# Patient Record
Sex: FEMALE | Race: BLACK OR AFRICAN AMERICAN | Employment: FULL TIME | URBAN - METROPOLITAN AREA
[De-identification: names, ages, dates, MRNs, and addresses within clinical notes are randomized per-mention and may not be internally consistent; named-entity substitution may affect disease eponyms.]

---

## 2018-03-05 ENCOUNTER — APPOINTMENT (OUTPATIENT)
Dept: PHYSICAL THERAPY | Age: 23
End: 2018-03-05

## 2021-02-14 ENCOUNTER — APPOINTMENT (OUTPATIENT)
Dept: GENERAL RADIOLOGY | Age: 26
End: 2021-02-14
Attending: EMERGENCY MEDICINE
Payer: MEDICAID

## 2021-02-14 ENCOUNTER — HOSPITAL ENCOUNTER (EMERGENCY)
Age: 26
Discharge: HOME | End: 2021-02-14
Attending: EMERGENCY MEDICINE
Payer: MEDICAID

## 2021-02-14 VITALS
RESPIRATION RATE: 18 BRPM | SYSTOLIC BLOOD PRESSURE: 133 MMHG | DIASTOLIC BLOOD PRESSURE: 77 MMHG | WEIGHT: 167 LBS | OXYGEN SATURATION: 100 % | HEART RATE: 62 BPM | TEMPERATURE: 98.9 F | HEIGHT: 62 IN | BODY MASS INDEX: 30.73 KG/M2

## 2021-02-14 DIAGNOSIS — R51.9 NONINTRACTABLE HEADACHE, UNSPECIFIED CHRONICITY PATTERN, UNSPECIFIED HEADACHE TYPE: ICD-10-CM

## 2021-02-14 DIAGNOSIS — E86.0 MILD DEHYDRATION: ICD-10-CM

## 2021-02-14 DIAGNOSIS — U07.1 COVID-19: Primary | ICD-10-CM

## 2021-02-14 LAB
ALBUMIN SERPL-MCNC: 3.9 G/DL (ref 3.4–5)
ALBUMIN/GLOB SERPL: 1 {RATIO} (ref 0.8–1.7)
ALP SERPL-CCNC: 93 U/L (ref 45–117)
ALT SERPL-CCNC: 14 U/L (ref 13–56)
ANION GAP SERPL CALC-SCNC: 4 MMOL/L (ref 3–18)
AST SERPL-CCNC: 8 U/L (ref 10–38)
BASOPHILS # BLD: 0 K/UL (ref 0–0.1)
BASOPHILS NFR BLD: 0 % (ref 0–2)
BILIRUB SERPL-MCNC: 0.3 MG/DL (ref 0.2–1)
BUN SERPL-MCNC: 9 MG/DL (ref 7–18)
BUN/CREAT SERPL: 11 (ref 12–20)
CALCIUM SERPL-MCNC: 8.9 MG/DL (ref 8.5–10.1)
CHLORIDE SERPL-SCNC: 108 MMOL/L (ref 100–111)
CO2 SERPL-SCNC: 28 MMOL/L (ref 21–32)
CREAT SERPL-MCNC: 0.82 MG/DL (ref 0.6–1.3)
DIFFERENTIAL METHOD BLD: NORMAL
EOSINOPHIL # BLD: 0 K/UL (ref 0–0.4)
EOSINOPHIL NFR BLD: 1 % (ref 0–5)
ERYTHROCYTE [DISTWIDTH] IN BLOOD BY AUTOMATED COUNT: 12.1 % (ref 11.6–14.5)
GLOBULIN SER CALC-MCNC: 3.8 G/DL (ref 2–4)
GLUCOSE SERPL-MCNC: 83 MG/DL (ref 74–99)
HCT VFR BLD AUTO: 43.7 % (ref 35–45)
HGB BLD-MCNC: 14.2 G/DL (ref 12–16)
LYMPHOCYTES # BLD: 1.6 K/UL (ref 0.9–3.6)
LYMPHOCYTES NFR BLD: 35 % (ref 21–52)
MCH RBC QN AUTO: 29.8 PG (ref 24–34)
MCHC RBC AUTO-ENTMCNC: 32.5 G/DL (ref 31–37)
MCV RBC AUTO: 91.8 FL (ref 74–97)
MONOCYTES # BLD: 0.4 K/UL (ref 0.05–1.2)
MONOCYTES NFR BLD: 8 % (ref 3–10)
NEUTS SEG # BLD: 2.6 K/UL (ref 1.8–8)
NEUTS SEG NFR BLD: 56 % (ref 40–73)
PLATELET # BLD AUTO: 228 K/UL (ref 135–420)
PMV BLD AUTO: 11.2 FL (ref 9.2–11.8)
POTASSIUM SERPL-SCNC: 3.6 MMOL/L (ref 3.5–5.5)
PROT SERPL-MCNC: 7.7 G/DL (ref 6.4–8.2)
RBC # BLD AUTO: 4.76 M/UL (ref 4.2–5.3)
SODIUM SERPL-SCNC: 140 MMOL/L (ref 136–145)
WBC # BLD AUTO: 4.7 K/UL (ref 4.6–13.2)

## 2021-02-14 PROCEDURE — 96361 HYDRATE IV INFUSION ADD-ON: CPT

## 2021-02-14 PROCEDURE — 74011250636 HC RX REV CODE- 250/636: Performed by: EMERGENCY MEDICINE

## 2021-02-14 PROCEDURE — 96375 TX/PRO/DX INJ NEW DRUG ADDON: CPT

## 2021-02-14 PROCEDURE — 96360 HYDRATION IV INFUSION INIT: CPT

## 2021-02-14 PROCEDURE — 71045 X-RAY EXAM CHEST 1 VIEW: CPT

## 2021-02-14 PROCEDURE — 85025 COMPLETE CBC W/AUTO DIFF WBC: CPT

## 2021-02-14 PROCEDURE — 96374 THER/PROPH/DIAG INJ IV PUSH: CPT

## 2021-02-14 PROCEDURE — 80053 COMPREHEN METABOLIC PANEL: CPT

## 2021-02-14 PROCEDURE — 99284 EMERGENCY DEPT VISIT MOD MDM: CPT

## 2021-02-14 RX ORDER — BUTALBITAL AND ACETAMINOPHEN 50; 300 MG/1; MG/1
1 CAPSULE ORAL
Qty: 15 CAP | Refills: 0 | Status: SHIPPED | OUTPATIENT
Start: 2021-02-14 | End: 2021-02-19

## 2021-02-14 RX ORDER — KETOROLAC TROMETHAMINE 30 MG/ML
15 INJECTION, SOLUTION INTRAMUSCULAR; INTRAVENOUS
Status: COMPLETED | OUTPATIENT
Start: 2021-02-14 | End: 2021-02-14

## 2021-02-14 RX ORDER — DIPHENHYDRAMINE HYDROCHLORIDE 50 MG/ML
25 INJECTION, SOLUTION INTRAMUSCULAR; INTRAVENOUS
Status: COMPLETED | OUTPATIENT
Start: 2021-02-14 | End: 2021-02-14

## 2021-02-14 RX ORDER — PROCHLORPERAZINE EDISYLATE 5 MG/ML
10 INJECTION INTRAMUSCULAR; INTRAVENOUS ONCE
Status: COMPLETED | OUTPATIENT
Start: 2021-02-14 | End: 2021-02-14

## 2021-02-14 RX ORDER — LEVONORGESTREL 52 MG/1
1 INTRAUTERINE DEVICE INTRAUTERINE ONCE
COMMUNITY
End: 2022-09-07

## 2021-02-14 RX ADMIN — PROCHLORPERAZINE EDISYLATE 10 MG: 5 INJECTION INTRAMUSCULAR; INTRAVENOUS at 20:33

## 2021-02-14 RX ADMIN — DIPHENHYDRAMINE HYDROCHLORIDE 25 MG: 50 INJECTION, SOLUTION INTRAMUSCULAR; INTRAVENOUS at 20:33

## 2021-02-14 RX ADMIN — KETOROLAC TROMETHAMINE 15 MG: 30 INJECTION, SOLUTION INTRAMUSCULAR at 20:33

## 2021-02-14 RX ADMIN — SODIUM CHLORIDE 1000 ML: 900 INJECTION, SOLUTION INTRAVENOUS at 20:35

## 2021-02-15 ENCOUNTER — PATIENT OUTREACH (OUTPATIENT)
Dept: CASE MANAGEMENT | Age: 26
End: 2021-02-15

## 2021-02-15 NOTE — DISCHARGE INSTRUCTIONS
[facility] Guidance for Preventing the Spread of Coronavirus Disease 2019 (COVID-19) in Homes and Residential Communities  Prevention steps for:  People with confirmed or suspected COVID-19 (including persons under investigation) who do not need to be hospitalized  and  People with confirmed COVID-19 who were hospitalized and determined to be medically stable to go home  Your healthcare provider and public health staff will evaluate whether you can be cared for at home. If it is determined that you do not need to be hospitalized and can be isolated at home, you will be monitored by staff from your local or state health department. You should follow the prevention steps below until a healthcare provider or local or state health department says you can return to your normal activities. Stay home except to get medical care  You should restrict activities outside your home, except for getting medical care. Do not go to work, school, or public areas. Avoid using public transportation, ride-sharing, or taxis. Separate yourself from other people and animals in your home  People: As much as possible, you should stay in a specific room and away from other people in your home. Also, you should use a separate bathroom, if available. Animals: You should restrict contact with pets and other animals while you are sick with COVID-19, just like you would around other people. Although there have not been reports of pets or other animals becoming sick with COVID-19, it is still recommended that people sick with COVID-19 limit contact with animals until more information is known about the virus. When possible, have another member of your household care for your animals while you are sick. If you are sick with COVID-19, avoid contact with your pet, including petting, snuggling, being kissed or licked, and sharing food.  If you must care for your pet or be around animals while you are sick, wash your hands before and after you interact with pets and wear a facemask. Call ahead before visiting your doctor  If you have a medical appointment, call the healthcare provider and tell them that you have or may have COVID-19. This will help the healthcare provider's office take steps to keep other people from getting infected or exposed. Wear a facemask  You should wear a facemask when you are around other people (e.g., sharing a room or vehicle) or pets and before you enter a healthcare provider's office. If you are not able to wear a facemask (for example, because it causes trouble breathing), then people who live with you should not stay in the same room with you, or they should wear a facemask if they enter your room. Cover your coughs and sneezes  Cover your mouth and nose with a tissue when you cough or sneeze. Throw used tissues in a lined trash can; immediately wash your hands with soap and water for at least 20 seconds or clean your hands with an alcohol-based hand  that contains 60 to 95% alcohol, covering all surfaces of your hands and rubbing them together until they feel dry. Soap and water should be used preferentially if hands are visibly dirty. Clean your hands often  Wash your hands often with soap and water for at least 20 seconds or clean your hands with an alcohol-based hand  that contains 60 to 95% alcohol, covering all surfaces of your hands and rubbing them together until they feel dry. Soap and water should be used preferentially if hands are visibly dirty. Avoid touching your eyes, nose, and mouth with unwashed hands. Avoid sharing personal household items  You should not share dishes, drinking glasses, cups, eating utensils, towels, or bedding with other people or pets in your home. After using these items, they should be washed thoroughly with soap and water.   Clean all high-touch surfaces everyday  High touch surfaces include counters, tabletops, doorknobs, bathroom fixtures, toilets, phones, keyboards, tablets, and bedside tables. Also, clean any surfaces that may have blood, stool, or body fluids on them. Use a household cleaning spray or wipe, according to the label instructions. Labels contain instructions for safe and effective use of the cleaning product including precautions you should take when applying the product, such as wearing gloves and making sure you have good ventilation during use of the product. Monitor your symptoms  Seek prompt medical attention if your illness is worsening (e.g., difficulty breathing). Before seeking care, call your healthcare provider and tell them that you have, or are being evaluated for, COVID-19. Put on a facemask before you enter the facility. These steps will help the healthcare provider's office to keep other people in the office or waiting room from getting infected or exposed. Ask your healthcare provider to call the local or state health department. Persons who are placed under active monitoring or facilitated self-monitoring should follow instructions provided by their local health department or occupational health professionals, as appropriate. If you have a medical emergency and need to call 911, notify the dispatch personnel that you have, or are being evaluated for COVID-19. If possible, put on a facemask before emergency medical services arrive. Discontinuing home isolation  Patients with confirmed COVID-19 should remain under home isolation precautions until the risk of secondary transmission to others is thought to be low. The decision to discontinue home isolation precautions should be made on a case-by-case basis, in consultation with healthcare providers and state and local health departments.   Recommended precautions for household members, intimate partners, and caregivers in a nonhealthcare setting of  A patient with symptomatic laboratory-confirmed COVID-19  or  A patient under investigation  Household members, intimate partners, and caregivers in a nonhealthcare setting may have close contact2 with a person with symptomatic, laboratory-confirmed COVID-19 or a person under investigation. Close contacts should monitor their health; they should call their healthcare provider right away if they develop symptoms suggestive of COVID-19 (e.g., fever, cough, shortness of breath)   Close contacts should also follow these recommendations:  Make sure that you understand and can help the patient follow their healthcare provider's instructions for medication(s) and care. You should help the patient with basic needs in the home and provide support for getting groceries, prescriptions, and other personal needs. Monitor the patient's symptoms. If the patient is getting sicker, call his or her healthcare provider and tell them that the patient has laboratory-confirmed COVID-19. This will help the healthcare provider's office take steps to keep other people in the office or waiting room from getting infected. Ask the healthcare provider to call the local or [facility] health department for additional guidance. If the patient has a medical emergency and you need to call 911, notify the dispatch personnel that the patient has, or is being evaluated for COVID-19. Household members should stay in another room or be  from the patient as much as possible. Household members should use a separate bedroom and bathroom, if available. Prohibit visitors who do not have an essential need to be in the home. Household members should care for any pets in the home. Do not handle pets or other animals while sick. Make sure that shared spaces in the home have good air flow, such as by an air conditioner or an opened window, weather permitting. Perform hand hygiene frequently.  Wash your hands often with soap and water for at least 20 seconds or use an alcohol-based hand  that contains 60 to 95% alcohol, covering all surfaces of your hands and rubbing them together until they feel dry. Soap and water should be used preferentially if hands are visibly dirty. Avoid touching your eyes, nose, and mouth with unwashed hands. You and the patient should wear a facemask if you are in the same room. Wear a disposable facemask and gloves when you touch or have contact with the patient's blood, stool, or body fluids, such as saliva, sputum, nasal mucus, vomit, urine. Throw out disposable facemasks and gloves after using them. Do not reuse. When removing personal protective equipment, first remove and dispose of gloves. Then, immediately clean your hands with soap and water or alcohol-based hand . Next, remove and dispose of facemask, and immediately clean your hands again with soap and water or alcohol-based hand . Avoid sharing household items with the patient. You should not share dishes, drinking glasses, cups, eating utensils, towels, bedding, or other items. After the patient uses these items, you should wash them thoroughly (see below [employer]). Clean all high-touch surfaces, such as counters, tabletops, doorknobs, bathroom fixtures, toilets, phones, keyboards, tablets, and bedside tables, every day. Also, clean any surfaces that may have blood, stool, or body fluids on them. Use a household cleaning spray or wipe, according to the label instructions. Labels contain instructions for safe and effective use of the cleaning product including precautions you should take when applying the product, such as wearing gloves and making sure you have good ventilation during use of the product. [address] thoroughly. Immediately remove and wash clothes or bedding that have blood, stool, or body fluids on them. Wear disposable gloves while handling soiled items and keep soiled items away from your body. Clean your hands (with soap and water or an alcohol-based hand ) immediately after removing your gloves.   Read and follow directions on labels of laundry or clothing items and detergent. In general, using a normal laundry detergent according to washing machine instructions and dry thoroughly using the warmest temperatures recommended on the clothing label. Place all used disposable gloves, facemasks, and other contaminated items in a lined container before disposing of them with other household waste. Clean your hands (with soap and water or an alcohol-based hand ) immediately after handling these items. Soap and water should be used preferentially if hands are visibly dirty. Discuss any additional questions with your state or local health department or healthcare provider. Footnotes  2Close contact is defined as--  a) being within approximately 6 feet (2 meters) of a COVID-19 case for a prolonged period of time; close contact can occur while caring for, living with, visiting, or sharing a health care waiting area or room with a COVID-19 case  - or -  b) having direct contact with infectious secretions of a COVID-19 case (e.g., being coughed on).   Page last reviewed: February 18, 2020   Content source: [facility] Division of Viral Diseases

## 2021-02-15 NOTE — PROGRESS NOTES
Outreach attempt regarding COVID-19 diagnosis\". Discussed COVID-19 related testing which was not done at this time. Test results were not done. Patient informed of results, if available? N/a  Patient reports she tested positive for COVID-19 at [facility] 6 days ago. Date/Time:  2/15/2021 4:44 PM   Call within 2 business days of discharge: Yes     Care Transitions Nurse (CTN) made two separate attempts to contact patient on 02/15/21  to complete COVID care transitions. Unable to reach. Left HIPAA compliant [facility] requesting a return call. Resolving COVID Care Transitions episode. Patient has CTN's contact information.

## 2021-02-15 NOTE — ED PROVIDER NOTES
EMERGENCY DEPARTMENT HISTORY AND PHYSICAL EXAM    8:15 PM    Date: 2/14/2021  Patient Name: Sung Hermosillo    History of Presenting Illness     Chief Complaint   Patient presents with    Headache    Shortness of Breath    Nausea       History Provided By: Patient  Location/Duration/Severity/Modifying factors   Patient is a 47-year-old female presenting to the emergency department with a chief complaint of a headache primarily. She complains of right of other symptoms including mildly short of breath, not experiencing chest pain, complains of nausea was well. She does feel photophobic. She was diagnosed with COVID-19 4 days ago, symptoms really started about 6 days ago. She has loss of taste and smell as well. She denies any PEs in the past.  Not taking any oral contraceptives. No recent long travel, no leg swelling. She tells me that she feels quite anxious about all of her symptoms but her primary concern is her headache. Patient indicates that that has been going on for the past 2 days, is been waxing waning but is at times quite severe, located the right side of her head, described as throbbing. Complains of some muscle spasms in her right shoulder. Has diffuse myalgias. Has been having fevers but none present at this time. Feels generally fatigued as well. Headache   Associated symptoms include shortness of breath and nausea. Pertinent negatives include no fever and no vomiting. Shortness of Breath  Associated symptoms include headaches. Pertinent negatives include no fever, no rhinorrhea, no neck pain, no cough, no chest pain, no vomiting, no abdominal pain and no rash. Nausea   Associated symptoms include headaches and headaches. Pertinent negatives include no chills, no fever, no abdominal pain, no diarrhea and no cough.        PCP: None    Current Outpatient Medications   Medication Sig Dispense Refill    levonorgestreL (Mirena) 20 mcg/24 hours (6 yrs) 52 mg IUD 1 Device by IntraUTERine route once.  butalbitaL-acetaminophen  mg cap Take 1 Cap by mouth every eight (8) hours as needed for Pain for up to 5 days. Contains acetaminophen (Tylenol), do not exceed 4000 mg of acetaminophen/Tylenol in a 24-hour period if taking from other sources. 15 Cap 0       Past History     Past Medical History:  History reviewed. No pertinent past medical history. Past Surgical History:  No past surgical history on file. Family History:  History reviewed. No pertinent family history. Social History:  Social History     Tobacco Use    Smoking status: Never Smoker    Smokeless tobacco: Never Used   Substance Use Topics    Alcohol use: No    Drug use: No       Allergies: Allergies   Allergen Reactions    Flagyl [Metronidazole] Anaphylaxis       I reviewed and confirmed the above information with patient and updated as necessary. Review of Systems     Review of Systems   Constitutional: Negative for chills and fever. HENT: Negative for congestion, rhinorrhea, sinus pressure and sneezing. Eyes: Negative for visual disturbance. Respiratory: Positive for shortness of breath. Negative for cough. Cardiovascular: Negative for chest pain. Gastrointestinal: Positive for nausea. Negative for abdominal pain, diarrhea and vomiting. Genitourinary: Negative for dysuria, frequency and urgency. Musculoskeletal: Negative for back pain and neck pain. Skin: Negative for rash. Neurological: Positive for headaches. Negative for syncope and numbness. Physical Exam     Visit Vitals  /77 (BP 1 Location: Right arm)   Pulse 62   Temp 98.9 °F (37.2 °C)   Resp 18   Ht 5' 2\" (1.575 m)   Wt 75.8 kg (167 lb)   LMP 02/09/2021 (Exact Date)   SpO2 100%   BMI 30.54 kg/m²       Physical Exam  Vitals signs and nursing note reviewed. Constitutional:       General: She is not in acute distress. Appearance: Normal appearance. She is normal weight.    HENT:      Head: Normocephalic and atraumatic. Right Ear: External ear normal.      Left Ear: External ear normal.      Nose: Nose normal.      Mouth/Throat:      Mouth: Mucous membranes are moist.   Eyes:      Conjunctiva/sclera: Conjunctivae normal.   Neck:      Musculoskeletal: Normal range of motion and neck supple. Comments: No meningismus. Cardiovascular:      Rate and Rhythm: Normal rate and regular rhythm. Pulses: Normal pulses. Heart sounds: Normal heart sounds. No murmur. Pulmonary:      Effort: Pulmonary effort is normal. No tachypnea or accessory muscle usage. Breath sounds: Normal breath sounds. No decreased breath sounds, wheezing, rhonchi or rales. Abdominal:      General: Abdomen is flat. Tenderness: There is no abdominal tenderness. There is no guarding or rebound. Musculoskeletal: Normal range of motion. General: No swelling or tenderness. Right lower leg: She exhibits no tenderness. No edema. Left lower leg: She exhibits no tenderness. Skin:     General: Skin is warm and dry. Capillary Refill: Capillary refill takes less than 2 seconds. Findings: No rash. Neurological:      General: No focal deficit present. Mental Status: She is alert. Cranial Nerves: No cranial nerve deficit. Motor: No weakness. Diagnostic Study Results     Labs -  Recent Results (from the past 24 hour(s))   CBC WITH AUTOMATED DIFF    Collection Time: 02/14/21  8:38 PM   Result Value Ref Range    WBC 4.7 4.6 - 13.2 K/uL    RBC 4.76 4.20 - 5.30 M/uL    HGB 14.2 12.0 - 16.0 g/dL    HCT 43.7 35.0 - 45.0 %    MCV 91.8 74.0 - 97.0 FL    MCH 29.8 24.0 - 34.0 PG    MCHC 32.5 31.0 - 37.0 g/dL    RDW 12.1 11.6 - 14.5 %    PLATELET 347 232 - 758 K/uL    MPV 11.2 9.2 - 11.8 FL    NEUTROPHILS 56 40 - 73 %    LYMPHOCYTES 35 21 - 52 %    MONOCYTES 8 3 - 10 %    EOSINOPHILS 1 0 - 5 %    BASOPHILS 0 0 - 2 %    ABS. NEUTROPHILS 2.6 1.8 - 8.0 K/UL    ABS.  LYMPHOCYTES 1.6 0.9 - 3.6 K/UL ABS. MONOCYTES 0.4 0.05 - 1.2 K/UL    ABS. EOSINOPHILS 0.0 0.0 - 0.4 K/UL    ABS. BASOPHILS 0.0 0.0 - 0.1 K/UL    DF AUTOMATED     METABOLIC PANEL, COMPREHENSIVE    Collection Time: 02/14/21  8:38 PM   Result Value Ref Range    Sodium 140 136 - 145 mmol/L    Potassium 3.6 3.5 - 5.5 mmol/L    Chloride 108 100 - 111 mmol/L    CO2 28 21 - 32 mmol/L    Anion gap 4 3.0 - 18 mmol/L    Glucose 83 74 - 99 mg/dL    BUN 9 7.0 - 18 MG/DL    Creatinine 0.82 0.6 - 1.3 MG/DL    BUN/Creatinine ratio 11 (L) 12 - 20      GFR est AA >60 >60 ml/min/1.73m2    GFR est non-AA >60 >60 ml/min/1.73m2    Calcium 8.9 8.5 - 10.1 MG/DL    Bilirubin, total 0.3 0.2 - 1.0 MG/DL    ALT (SGPT) 14 13 - 56 U/L    AST (SGOT) 8 (L) 10 - 38 U/L    Alk. phosphatase 93 45 - 117 U/L    Protein, total 7.7 6.4 - 8.2 g/dL    Albumin 3.9 3.4 - 5.0 g/dL    Globulin 3.8 2.0 - 4.0 g/dL    A-G Ratio 1.0 0.8 - 1.7           Radiologic Studies -   XR CHEST PORT    (Results Pending)           Medical Decision Making   I am the first provider for this patient. I reviewed the vital signs, available nursing notes, past medical history, past surgical history, family history and social history. Vital Signs-Reviewed the patient's vital signs. Records Reviewed: Nursing Notes and Old Medical Records (Time of Review: 8:15 PM)    ED Course: Progress Notes, Reevaluation, and Consults:     Chest x-ray 1 view my interpretation: Normal cardiomediastinal silhouette, no pneumothorax, no consolidation or infiltrate. Provider Notes (Medical Decision Making):   MDM  Number of Diagnoses or Management Options  COVID-19  Mild dehydration  Nonintractable headache, unspecified chronicity pattern, unspecified headache type  Diagnosis management comments: 70-year-old female presenting with symptoms of headache, photophobia, sore eyes, loss of taste, myalgias and other COVID-19 symptoms.   On exam patient is well-appearing, she is not in any respiratory distress vital signs are normal and stable. She was a mild shortness of breath but no suggestion of PE. She has no meningismus or signs of meningitis, no head trauma to suggest may be subdural hematoma epidural hematoma or subarachnoid hemorrhage. Suspect that her symptoms are likely sequela of her COVID-19 infection. Will check basic labs, chest x-ray to rule out pneumonia, will treat her with Compazine, Benadryl Toradol and IV fluid bolus. She denies the possibility of pregnancy. Patient felt much better after receiving Compazine, Benadryl, Toradol and IV fluid bolus. She feels better and wants to go home. Vital signs are stable. No hypoxia, no suspicion at this point for intrathoracic or cardiopulmonary process. Suspect symptoms are likely secondary to Covid. Will discharge home with Fioricet. Counseled the patient on appropriate Tylenol use advised her to abstain from excessive Tylenol if she is using the Fioricet. Provide education that she can use up to 4000 mg/day but should not exceed that in a 24-hour period and consider all sources of Tylenol. Patient expresses understanding and all questions answered. Stable for discharge home. At this time, patient is stable and appropriate for discharge home.  Patient demonstrates understanding of current diagnoses and is in agreement with the treatment plan. Noreen Boss are advised that while the likelihood of serious underlying condition is low at this point given the evaluation performed today, we cannot fully rule it out. Noreen Hernandezing are advised to immediately return with any new symptoms or worsening of current condition.  All questions have been answered. Steffanie Homans is given educational material regarding their diagnoses, including danger symptoms and when to return to the ED. This note was dictated utilizing Dragon voice recognition software. Unfortunately this leads to occasional typographical errors. I apologize in advance if the situation occurs.  If questions occur please do not hesitate to contact me directly. Kathleen Green, DO          Procedures    Critical Care Time: 0    Diagnosis     Clinical Impression:   1. COVID-19    2. Nonintractable headache, unspecified chronicity pattern, unspecified headache type    3. Mild dehydration        Disposition: Discharge    Follow-up Information     Follow up With Specialties Details Why [address] [facility] ([address])  In 3 days [address] [facility] [place] [id number]  [phone]    [address]  In 3 days [address]   [address]  [address] ([facility]) [address]    Your Primary Care Physician  In 3 days      [facility] EMERGENCY DEPT Emergency Medicine  If symptoms worsen, As needed [address]  [phone]         Discharge Medication List as of 2/14/2021  9:44 PM      START taking these medications    Details   butalbitaL-acetaminophen  mg cap Take 1 Cap by mouth every eight (8) hours as needed for Pain for up to 5 days. Contains acetaminophen (Tylenol), do not exceed 4000 mg of acetaminophen/Tylenol in a 24-hour period if taking from other sources. , Normal, Disp-15 Cap, R-0         CONTINUE these medications which have NOT CHANGED    Details   levonorgestreL (Mirena) 20 mcg/24 hours (6 yrs) 52 mg IUD 1 Device by IntraUTERine route once., [address] [place] DO   Emergency Medicine   February 14, 2021, 8:15 PM     This note is dictated utilizing Dragon voice recognition software. Unfortunately this leads to occasional typographical errors using the voice recognition. I apologize in advance if the situation occurs. If questions occur please do not hesitate to contact me directly.     Kathleen Green, DO

## 2021-02-15 NOTE — ED TRIAGE NOTES
Pt to the ED via EMS with a c/c of generalized body aches after being diagnosed with COVID at [facility] 5 days ago. Complaining of a severe headache, N/V/D, cough, and chest pain. Pt does say she feels more SOB than before.

## 2021-02-26 ENCOUNTER — APPOINTMENT (OUTPATIENT)
Dept: CT IMAGING | Age: 26
End: 2021-02-26
Attending: PHYSICIAN ASSISTANT
Payer: MEDICAID

## 2021-02-26 ENCOUNTER — HOSPITAL ENCOUNTER (EMERGENCY)
Age: 26
Discharge: HOME | End: 2021-02-26
Attending: EMERGENCY MEDICINE
Payer: MEDICAID

## 2021-02-26 VITALS
RESPIRATION RATE: 18 BRPM | HEART RATE: 72 BPM | SYSTOLIC BLOOD PRESSURE: 127 MMHG | DIASTOLIC BLOOD PRESSURE: 87 MMHG | OXYGEN SATURATION: 100 % | TEMPERATURE: 97.6 F

## 2021-02-26 DIAGNOSIS — J32.8 OTHER CHRONIC SINUSITIS: ICD-10-CM

## 2021-02-26 DIAGNOSIS — L03.211 CELLULITIS OF LEFT EXTERNAL CHEEK: Primary | ICD-10-CM

## 2021-02-26 DIAGNOSIS — L02.91 PHLEGMON: ICD-10-CM

## 2021-02-26 LAB
ANION GAP SERPL CALC-SCNC: 4 MMOL/L (ref 3–18)
BASOPHILS # BLD: 0 K/UL (ref 0–0.1)
BASOPHILS NFR BLD: 0 % (ref 0–2)
BUN SERPL-MCNC: 7 MG/DL (ref 7–18)
BUN/CREAT SERPL: 8 (ref 12–20)
CALCIUM SERPL-MCNC: 8.9 MG/DL (ref 8.5–10.1)
CHLORIDE SERPL-SCNC: 109 MMOL/L (ref 100–111)
CO2 SERPL-SCNC: 29 MMOL/L (ref 21–32)
CREAT SERPL-MCNC: 0.84 MG/DL (ref 0.6–1.3)
DIFFERENTIAL METHOD BLD: ABNORMAL
EOSINOPHIL # BLD: 0.1 K/UL (ref 0–0.4)
EOSINOPHIL NFR BLD: 2 % (ref 0–5)
ERYTHROCYTE [DISTWIDTH] IN BLOOD BY AUTOMATED COUNT: 12.7 % (ref 11.6–14.5)
GLUCOSE SERPL-MCNC: 93 MG/DL (ref 74–99)
HCG UR QL: NEGATIVE
HCT VFR BLD AUTO: 42.3 % (ref 35–45)
HGB BLD-MCNC: 13.1 G/DL (ref 12–16)
LYMPHOCYTES # BLD: 1.3 K/UL (ref 0.9–3.6)
LYMPHOCYTES NFR BLD: 27 % (ref 21–52)
MCH RBC QN AUTO: 29.5 PG (ref 24–34)
MCHC RBC AUTO-ENTMCNC: 31 G/DL (ref 31–37)
MCV RBC AUTO: 95.3 FL (ref 74–97)
MONOCYTES # BLD: 0.5 K/UL (ref 0.05–1.2)
MONOCYTES NFR BLD: 11 % (ref 3–10)
NEUTS SEG # BLD: 2.9 K/UL (ref 1.8–8)
NEUTS SEG NFR BLD: 60 % (ref 40–73)
PLATELET # BLD AUTO: 222 K/UL (ref 135–420)
PMV BLD AUTO: 11 FL (ref 9.2–11.8)
POTASSIUM SERPL-SCNC: 3.9 MMOL/L (ref 3.5–5.5)
RBC # BLD AUTO: 4.44 M/UL (ref 4.2–5.3)
SODIUM SERPL-SCNC: 142 MMOL/L (ref 136–145)
WBC # BLD AUTO: 4.8 K/UL (ref 4.6–13.2)

## 2021-02-26 PROCEDURE — 74011000636 HC RX REV CODE- 636: Performed by: EMERGENCY MEDICINE

## 2021-02-26 PROCEDURE — 99283 EMERGENCY DEPT VISIT LOW MDM: CPT

## 2021-02-26 PROCEDURE — 85025 COMPLETE CBC W/AUTO DIFF WBC: CPT

## 2021-02-26 PROCEDURE — 70487 CT MAXILLOFACIAL W/DYE: CPT

## 2021-02-26 PROCEDURE — 81025 URINE PREGNANCY TEST: CPT

## 2021-02-26 PROCEDURE — 80048 BASIC METABOLIC PNL TOTAL CA: CPT

## 2021-02-26 RX ORDER — METHYLPREDNISOLONE 4 MG/1
TABLET ORAL
Qty: 1 DOSE PACK | Refills: 0 | OUTPATIENT
Start: 2021-02-26 | End: 2022-04-25

## 2021-02-26 RX ORDER — AMOXICILLIN AND CLAVULANATE POTASSIUM 875; 125 MG/1; MG/1
1 TABLET, FILM COATED ORAL 2 TIMES DAILY
Qty: 20 TAB | Refills: 0 | Status: SHIPPED | OUTPATIENT
Start: 2021-02-26 | End: 2021-03-08

## 2021-02-26 RX ORDER — FLUCONAZOLE 150 MG/1
150 TABLET ORAL
Qty: 1 TAB | Refills: 0 | Status: SHIPPED | OUTPATIENT
Start: 2021-02-26 | End: 2021-02-26

## 2021-02-26 RX ADMIN — IOPAMIDOL 100 ML: 612 INJECTION, SOLUTION INTRAVENOUS at 12:57

## 2021-02-26 NOTE — ED TRIAGE NOTES
Left side of face is swollen. States it feels numb. Woke up with left eye closed.    Does not think its dental.   Had wisdom tooth removed from left upper back in January

## 2021-02-26 NOTE — ED PROVIDER NOTES
EMERGENCY DEPARTMENT HISTORY AND PHYSICAL EXAM    Date: 2/26/2021  Patient Name: Mariah Pichardo    History of Presenting Illness     Chief Complaint:   Chief Complaint   Patient presents with    Facial Swelling     History Provided By: Patient    Additional History (Context): Mariah Pichardo is a 23 y.o. female c/o LT sided facial swelling that involves LT eye and maxillary sinus. Pt had LT upper wisdom tooth extracted last month and had to return to her dentist 3 times d/t pain. Notes that pain subsided then. Notes this morning she woke up with pain and swelling. She did not take any meds and did not contact her dentist as she did not believe her current symptoms are related to tooth problem. She notes that she just recovered from COVID-19 [place] off isolation in the past few days. PCP: None    Current Outpatient Medications   Medication Sig Dispense Refill    amoxicillin-clavulanate (Augmentin) 875-125 mg per tablet Take 1 Tab by mouth two (2) times a day for 10 days. 20 Tab 0    methylPREDNISolone (Medrol, Leland,) 4 mg tablet Follow package instructions. 1 Dose Pack 0    fluconazole (Diflucan) 150 mg tablet Take 1 Tab by mouth now for 1 dose. FDA advises cautious prescribing of oral fluconazole in pregnancy. 1 Tab 0    levonorgestreL (Mirena) 20 mcg/24 hours (6 yrs) 52 mg IUD 1 Device by IntraUTERine route once. Past History     Past Medical History:  History reviewed. No pertinent past medical history. Past Surgical History:  History reviewed. No pertinent surgical history. Family History:  History reviewed. No pertinent family history. Social History:  Social History     Tobacco Use    Smoking status: Never Smoker    Smokeless tobacco: Never Used   Substance Use Topics    Alcohol use: No    Drug use: No       Allergies:   Allergies   Allergen Reactions    Flagyl [Metronidazole] Anaphylaxis    Reglan [Metoclopramide] Swelling         Review of Systems   Review of Systems Constitutional: Negative for activity change, appetite change, chills and fever. HENT: Positive for dental problem (tooth extraction last month with slow healing but resolved now) and sinus pain. Negative for congestion, drooling, ear pain, postnasal drip, sinus pressure, sneezing, sore throat and trouble swallowing. Eyes: Negative. Respiratory: Negative for cough and shortness of breath. Cardiovascular: Negative. Gastrointestinal: Negative for nausea and vomiting. Neurological: Negative for dizziness, syncope and headaches. All other systems reviewed and are negative. All Other Systems Negative  Physical Exam     Vitals:    02/26/21 1103   BP: 127/87   Pulse: 72   Resp: 18   Temp: 97.6 °F (36.4 °C)   SpO2: 100%     Physical Exam  Vitals signs and nursing note reviewed. Constitutional:       General: She is not in acute distress. Appearance: She is well-developed. She is not toxic-appearing or diaphoretic. HENT:      Head: Normocephalic and atraumatic. No right periorbital erythema or left periorbital erythema. Right Ear: Tympanic membrane, ear canal and external ear normal.      Left Ear: Tympanic membrane, ear canal and external ear normal.      Nose: No septal deviation, nasal tenderness, mucosal edema, congestion or rhinorrhea. Right Turbinates: Not enlarged, swollen or pale. Left Turbinates: Not enlarged, swollen or pale. Right Sinus: No maxillary sinus tenderness or frontal sinus tenderness. Left Sinus: Maxillary sinus tenderness (no fluid collection on illumination) present. No frontal sinus tenderness. Mouth/Throat:      Mouth: Mucous membranes are moist.      Dentition: Normal dentition. No gingival swelling, dental caries, dental abscesses or gum lesions. Pharynx: Uvula midline. Eyes:      Extraocular Movements: Extraocular movements intact.       Conjunctiva/sclera: Conjunctivae normal.      Pupils: Pupils are equal, round, and reactive to light. Neck:      Musculoskeletal: Normal range of motion and neck supple. No neck rigidity or muscular tenderness. Vascular: No carotid bruit. Cardiovascular:      Rate and Rhythm: Normal rate and regular rhythm. Heart sounds: Normal heart sounds. Pulmonary:      Effort: Pulmonary effort is normal.      Breath sounds: Normal breath sounds. Lymphadenopathy:      Cervical: No cervical adenopathy. Skin:     General: Skin is warm and dry. Neurological:      Mental Status: She is alert and oriented to person, place, and time. Diagnostic Study Results     Labs -     Recent Results (from the past 12 hour(s))   HCG URINE, QL    Collection Time: 02/26/21 11:18 AM   Result Value Ref Range    HCG urine, QL Negative NEG     CBC WITH AUTOMATED DIFF    Collection Time: 02/26/21 11:27 AM   Result Value Ref Range    WBC 4.8 4.6 - 13.2 K/uL    RBC 4.44 4.20 - 5.30 M/uL    HGB 13.1 12.0 - 16.0 g/dL    HCT 42.3 35.0 - 45.0 %    MCV 95.3 74.0 - 97.0 FL    MCH 29.5 24.0 - 34.0 PG    MCHC 31.0 31.0 - 37.0 g/dL    RDW 12.7 11.6 - 14.5 %    PLATELET 978 094 - 547 K/uL    MPV 11.0 9.2 - 11.8 FL    NEUTROPHILS 60 40 - 73 %    LYMPHOCYTES 27 21 - 52 %    MONOCYTES 11 (H) 3 - 10 %    EOSINOPHILS 2 0 - 5 %    BASOPHILS 0 0 - 2 %    ABS. NEUTROPHILS 2.9 1.8 - 8.0 K/UL    ABS. LYMPHOCYTES 1.3 0.9 - 3.6 K/UL    ABS. MONOCYTES 0.5 0.05 - 1.2 K/UL    ABS. EOSINOPHILS 0.1 0.0 - 0.4 K/UL    ABS.  BASOPHILS 0.0 0.0 - 0.1 K/UL    DF AUTOMATED     METABOLIC PANEL, BASIC    Collection Time: 02/26/21 11:27 AM   Result Value Ref Range    Sodium 142 136 - 145 mmol/L    Potassium 3.9 3.5 - 5.5 mmol/L    Chloride 109 100 - 111 mmol/L    CO2 29 21 - 32 mmol/L    Anion gap 4 3.0 - 18 mmol/L    Glucose 93 74 - 99 mg/dL    BUN 7 7.0 - 18 MG/DL    Creatinine 0.84 0.6 - 1.3 MG/DL    BUN/Creatinine ratio 8 (L) 12 - 20      GFR est AA >60 >60 ml/min/1.73m2    GFR est non-AA >60 >60 ml/min/1.73m2    Calcium 8.9 8.5 - 10.1 MG/DL Radiologic Studies -   CT MAXILLOFACIAL W CONT   Final Result      1. Left first maxillary tricuspid prominent lamina dura and cortical dehiscence,   may reflect local radicular abscess. Associated mixed density swelling of the   adjacent left maxillary gingivobuccal tissues, likely local phlegmon. 2. Findings consistent with mild left cheek cellulitis. 3. Mild chronic left maxillary sinusitis. Other minor and/or ancillary findings   as above           CT Results  (Last 48 hours)               02/26/21 1255  CT MAXILLOFACIAL W CONT Final result    Impression:      1. Left first maxillary tricuspid prominent lamina dura and cortical dehiscence,   may reflect local radicular abscess. Associated mixed density swelling of the   adjacent left maxillary gingivobuccal tissues, likely local phlegmon. 2. Findings consistent with mild left cheek cellulitis. 3. Mild chronic left maxillary sinusitis. Other minor and/or ancillary findings   as above           Narrative:      ZYGOMATICOMAXILLARY/ FACIAL CT       History: Left periocular swelling possible orbital abscess       CT technique:        Serial axial noncontrast helical CT performed through the base to include the   orbits paranasal sinuses and maxilla. Coronal reformats performed from axial helical data, to best assess   relationships at orbital roofs, orbital floors, and sinus walls/ variants for   potential presurgical planning . Dose reduction technique:    Automated exposure control, adjustment of the mAs and/or kVp according to the   patient 's size, and iterative reconstruction techniques were used. Specifics   for this study were placed by technologist staff into the patient's electronic   medical record.        Approximate dose, if determined, reference air kerma:   Digital imaging and Communication in Medicine [DICOM] format image data are   available to nonaffiliated external healthcare facilities or entities on a secure media free reciprocally searchable basis, with patient authorization, for   at least a 12 month period after this study. If not available when requested,   the health care system, which is responsible for storage archival and delivery,   should be contacted directly. MACRO DOSE       No prior exams available. CT findings: There is mild asymmetric soft tissue reticulation and haziness in the   superficial left upper cheek extending to the inferior orbital rim. No discrete   rim-enhancing fluid collection. There is focal prominence of the left first   maxillary tricuspid lamina dura with suggestion of dehiscence of the adjacent   overlying cortex. There is associated mild asymmetric soft tissue thickening of   the local gingivobuccal soft tissues with minimal underlying lucency but without   a defined discrete rim-enhancing fluid collection. No evident acute fracture       ORBITS:   Orbits appear unremarkable. Post septal left orbit appears unremarkable   including intact globe, unremarkable retrobulbar fat and unremarkable appearing   extraocular muscles and optic nerve sheath complex. Unremarkable   pterygomaxillary spaces           SINUSES:   Mild left maxillary sinus mucosal thickening mostly over the alveolar recess and   lower nasomaxillary wall. Patent ostiomeatal unit complex. No evident sinonasal   air-fluid/hemorrhage level. Aplastic left frontal sinus       MISC:   Minimal mandibular level gingivobuccal air but no discrete bone destruction or   rim-enhancing collection. Torus palatini variant. Left nasal ring jewelry. Probable dystrophic left tonsillar pillar calcification; unremarkable   parapharyngeal space fat                   Medical Decision Making   I am the first provider for this patient. I reviewed the vital signs, available nursing notes, past medical history, past surgical history, family history and social history.     Vital Signs-Reviewed the patient's vital signs. Records Reviewed: Nursing Notes, Old Medical Records and Previous Laboratory Studies    Procedures:  Procedures    Provider Notes (Medical Decision Making):     Well-appearing pt presents to ED with c/o pain and swelling of LT side of face involving sinus and eye. Pt had tooth extraction last month and reports slow healing and lingering pain but all resolved. Pt also just recovered from [place]. Given this hx, did maxfac CT and cellulitis and phlegmon were detected. Patient has no trismus, no airway compromise. She can be safely treated outpatient, with antibiotic and short course of steroids to reduce inflammation. Patient is instructed to follow-up with her dentist and PCP to ensure resolution as soon as she can. Discussed results, care in ED and further care, f/u and s/s warranting return to ED. Pt and mother present over pt's video call understood and agreed to plan. Consult:    2:01 PM   Discussed care with Tami Erwin MD ED attending. Standard discussion; including history of patients chief complaint, available diagnostic results, and treatment course. PLAN: agrees that dc with abx with a short steroid taper and f/u with her dentist and PCP is appropriate. Brandi Smith PA-C       MED RECONCILIATION:  No current facility-administered medications for this encounter. Current Outpatient Medications   Medication Sig    amoxicillin-clavulanate (Augmentin) 875-125 mg per tablet Take 1 Tab by mouth two (2) times a day for 10 days.  methylPREDNISolone (Medrol, Leland,) 4 mg tablet Follow package instructions.  fluconazole (Diflucan) 150 mg tablet Take 1 Tab by mouth now for 1 dose. FDA advises cautious prescribing of oral fluconazole in pregnancy.  levonorgestreL (Mirena) 20 mcg/24 hours (6 yrs) 52 mg IUD 1 Device by IntraUTERine route once. Disposition:  home    DISCHARGE NOTE:     Pt has been reexamined.   Patient has no new complaints, changes, or physical findings. Care plan outlined and precautions discussed. Results of labs and CT were reviewed with the patient. All medications were reviewed with the patient; will d/c home. All of pt's questions and concerns were addressed. Patient was instructed and agrees to follow up with dentist and PCP, as well as to return to the ED upon further deterioration. Patient is ready to go home. Follow-up Information    None         Current Discharge Medication List      START taking these medications    Details   amoxicillin-clavulanate (Augmentin) 875-125 mg per tablet Take 1 Tab by mouth two (2) times a day for 10 days. Qty: 20 Tab, Refills: 0      methylPREDNISolone (Medrol, Leland,) 4 mg tablet Follow package instructions. Qty: 1 Dose Pack, Refills: 0      fluconazole (Diflucan) 150 mg tablet Take 1 Tab by mouth now for 1 dose. FDA advises cautious prescribing of oral fluconazole in pregnancy. Qty: 1 Tab, Refills: 0                 Diagnosis     Clinical Impression:   1. Cellulitis of left external cheek    2. Phlegmon    3. Other chronic sinusitis          Dictation disclaimer:  Please note that this dictation was completed with BRAIN, the computer voice recognition software. Quite often unanticipated grammatical, syntax, homophones, and other interpretive errors are inadvertently transcribed by the computer software. Please disregard these errors. Please excuse any errors that have escaped final proofreading.

## 2021-02-26 NOTE — DISCHARGE INSTRUCTIONS
Take antibiotics as directed. Finish an entire course! Increase intake of over-the-counter probiotics (probiotic supplements, yogurt) when taking antibiotics to prevent antibiotic associated diarrhea and yeast infection. Use secondary birth control when taking antibiotics (for females, if applicable)   Finish oral steroids (like Medrol, Prednisone, Dexamethasone, Sterapred, Prednisolone, Orapred, etc) as directed. Note that steroids may cause temporary increase of blood glucose levels, if you are diabetic, monitor your glucose levels closely. Stop steroids and consult your primary care provider or endocrinologist if your levels increase significantly. Take Tylenol/Acetaminophen (every 4-6 hours) and/or Motrin/Ibuprofen/Advil (every 6-8 hours) or Naprosyn/Naproxyn/Aleve for fever or pain as needed. Follow up with your dentist and primary care provider or the provided referral for further evaluation and management  Return to emergency room at once for worsening or new symptoms.

## 2021-02-27 ENCOUNTER — PATIENT OUTREACH (OUTPATIENT)
Dept: CASE MANAGEMENT | Age: 26
End: 2021-02-27

## 2021-02-27 NOTE — PROGRESS NOTES
Patient contacted regarding recent discharge. CTN  Attempt to contact patient via telephone on 2/27/21 for post ED follow up. Unable to reach. Left message on voicemail with office contact information. No Patient medical information left on message.

## 2021-03-02 ENCOUNTER — PATIENT OUTREACH (OUTPATIENT)
Dept: CASE MANAGEMENT | Age: 26
End: 2021-03-02

## 2021-03-02 NOTE — PROGRESS NOTES
Patient resolved from [address] Transitions episode on 3/2/21. Patient contacted regarding recent discharge. CTN  Attempt to contact patient via telephone on 3/2/21 for post ED follow up. Unable to reach. Left message on voicemail with office contact information. No Patient medical information left on message. 3:49 PM-  Received a missed call from Pt.     3:50 PM CTN  Attempt to return missed call from Pt. via telephone on 3/2/21. Unable to reach. Left message on voicemail with office contact information. No Patient medical information left on message. No further outreach scheduled with this CTN. Episode of Care resolved.

## 2022-04-25 ENCOUNTER — HOSPITAL ENCOUNTER (EMERGENCY)
Age: 27
Discharge: HOME | End: 2022-04-25
Attending: EMERGENCY MEDICINE
Payer: MEDICAID

## 2022-04-25 VITALS
OXYGEN SATURATION: 98 % | HEART RATE: 53 BPM | DIASTOLIC BLOOD PRESSURE: 78 MMHG | WEIGHT: 158 LBS | RESPIRATION RATE: 16 BRPM | HEIGHT: 62 IN | BODY MASS INDEX: 29.08 KG/M2 | TEMPERATURE: 98.4 F | SYSTOLIC BLOOD PRESSURE: 121 MMHG

## 2022-04-25 DIAGNOSIS — R82.4 KETONURIA: ICD-10-CM

## 2022-04-25 DIAGNOSIS — R82.81 PYURIA: ICD-10-CM

## 2022-04-25 DIAGNOSIS — R11.2 NAUSEA AND VOMITING, UNSPECIFIED VOMITING TYPE: Primary | ICD-10-CM

## 2022-04-25 LAB
ALBUMIN SERPL-MCNC: 4.4 G/DL (ref 3.4–5)
ALBUMIN/GLOB SERPL: 1 {RATIO} (ref 0.8–1.7)
ALP SERPL-CCNC: 76 U/L (ref 45–117)
ALT SERPL-CCNC: 22 U/L (ref 13–56)
ANION GAP SERPL CALC-SCNC: 7 MMOL/L (ref 3–18)
APPEARANCE UR: CLEAR
AST SERPL-CCNC: 20 U/L (ref 10–38)
BACTERIA URNS QL MICRO: ABNORMAL /HPF
BASOPHILS # BLD: 0 K/UL (ref 0–0.1)
BASOPHILS NFR BLD: 0 % (ref 0–2)
BILIRUB SERPL-MCNC: 0.5 MG/DL (ref 0.2–1)
BILIRUB UR QL: NEGATIVE
BUN SERPL-MCNC: 12 MG/DL (ref 7–18)
BUN/CREAT SERPL: 13 (ref 12–20)
CALCIUM SERPL-MCNC: 10.1 MG/DL (ref 8.5–10.1)
CHLORIDE SERPL-SCNC: 110 MMOL/L (ref 100–111)
CO2 SERPL-SCNC: 26 MMOL/L (ref 21–32)
COLOR UR: ABNORMAL
CREAT SERPL-MCNC: 0.95 MG/DL (ref 0.6–1.3)
DIFFERENTIAL METHOD BLD: ABNORMAL
EOSINOPHIL # BLD: 0 K/UL (ref 0–0.4)
EOSINOPHIL NFR BLD: 0 % (ref 0–5)
EPITH CASTS URNS QL MICRO: ABNORMAL /LPF (ref 0–5)
ERYTHROCYTE [DISTWIDTH] IN BLOOD BY AUTOMATED COUNT: 12.2 % (ref 11.6–14.5)
GLOBULIN SER CALC-MCNC: 4.2 G/DL (ref 2–4)
GLUCOSE SERPL-MCNC: 111 MG/DL (ref 74–99)
GLUCOSE UR STRIP.AUTO-MCNC: NEGATIVE MG/DL
HCG SERPL QL: NEGATIVE
HCT VFR BLD AUTO: 43 % (ref 35–45)
HGB BLD-MCNC: 14.6 G/DL (ref 12–16)
HGB UR QL STRIP: ABNORMAL
IMM GRANULOCYTES # BLD AUTO: 0 K/UL (ref 0–0.04)
IMM GRANULOCYTES NFR BLD AUTO: 0 % (ref 0–0.5)
KETONES UR QL STRIP.AUTO: 80 MG/DL
LEUKOCYTE ESTERASE UR QL STRIP.AUTO: NEGATIVE
LIPASE SERPL-CCNC: 93 U/L (ref 73–393)
LYMPHOCYTES # BLD: 0.9 K/UL (ref 0.9–3.6)
LYMPHOCYTES NFR BLD: 11 % (ref 21–52)
MCH RBC QN AUTO: 29.8 PG (ref 24–34)
MCHC RBC AUTO-ENTMCNC: 34 G/DL (ref 31–37)
MCV RBC AUTO: 87.8 FL (ref 78–100)
MONOCYTES # BLD: 0.3 K/UL (ref 0.05–1.2)
MONOCYTES NFR BLD: 3 % (ref 3–10)
MUCOUS THREADS URNS QL MICRO: ABNORMAL /LPF
NEUTS SEG # BLD: 7.3 K/UL (ref 1.8–8)
NEUTS SEG NFR BLD: 85 % (ref 40–73)
NITRITE UR QL STRIP.AUTO: NEGATIVE
NRBC # BLD: 0 K/UL (ref 0–0.01)
NRBC BLD-RTO: 0 PER 100 WBC
PH UR STRIP: 6 [PH] (ref 5–8)
PLATELET # BLD AUTO: 263 K/UL (ref 135–420)
PMV BLD AUTO: 10.8 FL (ref 9.2–11.8)
POTASSIUM SERPL-SCNC: 3.8 MMOL/L (ref 3.5–5.5)
PROT SERPL-MCNC: 8.6 G/DL (ref 6.4–8.2)
PROT UR STRIP-MCNC: 30 MG/DL
RBC # BLD AUTO: 4.9 M/UL (ref 4.2–5.3)
RBC #/AREA URNS HPF: ABNORMAL /HPF (ref 0–5)
SODIUM SERPL-SCNC: 143 MMOL/L (ref 136–145)
SP GR UR REFRACTOMETRY: >1.03 (ref 1–1.03)
UROBILINOGEN UR QL STRIP.AUTO: 1 EU/DL (ref 0.2–1)
WBC # BLD AUTO: 8.5 K/UL (ref 4.6–13.2)
WBC URNS QL MICRO: ABNORMAL /HPF (ref 0–4)

## 2022-04-25 PROCEDURE — 85025 COMPLETE CBC W/AUTO DIFF WBC: CPT

## 2022-04-25 PROCEDURE — 80053 COMPREHEN METABOLIC PANEL: CPT

## 2022-04-25 PROCEDURE — 81001 URINALYSIS AUTO W/SCOPE: CPT

## 2022-04-25 PROCEDURE — 96374 THER/PROPH/DIAG INJ IV PUSH: CPT

## 2022-04-25 PROCEDURE — 96375 TX/PRO/DX INJ NEW DRUG ADDON: CPT

## 2022-04-25 PROCEDURE — 74011250636 HC RX REV CODE- 250/636: Performed by: EMERGENCY MEDICINE

## 2022-04-25 PROCEDURE — 83690 ASSAY OF LIPASE: CPT

## 2022-04-25 PROCEDURE — 87086 URINE CULTURE/COLONY COUNT: CPT

## 2022-04-25 PROCEDURE — 99284 EMERGENCY DEPT VISIT MOD MDM: CPT

## 2022-04-25 PROCEDURE — 96361 HYDRATE IV INFUSION ADD-ON: CPT

## 2022-04-25 PROCEDURE — 84703 CHORIONIC GONADOTROPIN ASSAY: CPT

## 2022-04-25 RX ORDER — ONDANSETRON 8 MG/1
8 TABLET, ORALLY DISINTEGRATING ORAL
Qty: 12 TABLET | Refills: 0 | OUTPATIENT
Start: 2022-04-25 | End: 2022-09-07

## 2022-04-25 RX ORDER — ONDANSETRON 2 MG/ML
4 INJECTION INTRAMUSCULAR; INTRAVENOUS
Status: COMPLETED | OUTPATIENT
Start: 2022-04-25 | End: 2022-04-25

## 2022-04-25 RX ORDER — CEPHALEXIN 500 MG/1
500 CAPSULE ORAL 3 TIMES DAILY
Qty: 21 CAPSULE | Refills: 0 | Status: SHIPPED | OUTPATIENT
Start: 2022-04-25 | End: 2022-05-02

## 2022-04-25 RX ORDER — FAMOTIDINE 10 MG/ML
20 INJECTION INTRAVENOUS
Status: COMPLETED | OUTPATIENT
Start: 2022-04-25 | End: 2022-04-25

## 2022-04-25 RX ORDER — DEXTROSE, SODIUM CHLORIDE, SODIUM LACTATE, POTASSIUM CHLORIDE, AND CALCIUM CHLORIDE 5; .6; .31; .03; .02 G/100ML; G/100ML; G/100ML; G/100ML; G/100ML
1000 INJECTION, SOLUTION INTRAVENOUS CONTINUOUS
Status: DISCONTINUED | OUTPATIENT
Start: 2022-04-25 | End: 2022-04-25 | Stop reason: HOSPADM

## 2022-04-25 RX ADMIN — ONDANSETRON 4 MG: 2 INJECTION INTRAMUSCULAR; INTRAVENOUS at 08:20

## 2022-04-25 RX ADMIN — FAMOTIDINE 20 MG: 10 INJECTION INTRAVENOUS at 08:20

## 2022-04-25 RX ADMIN — SODIUM CHLORIDE, SODIUM LACTATE, POTASSIUM CHLORIDE, CALCIUM CHLORIDE AND DEXTROSE MONOHYDRATE 1000 ML/HR: 5; 600; 310; 30; 20 INJECTION, SOLUTION INTRAVENOUS at 10:14
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
## 2022-04-25 NOTE — ED PROVIDER NOTES
EMERGENCY DEPARTMENT HISTORY AND PHYSICAL EXAM    7:12 AM      Date: 4/25/2022  Patient Name: Hien Liao    History of Presenting Illness     Chief Complaint   Patient presents with    Abdominal Pain    Vomiting         History Provided By: Patient  Location/Duration/Severity/Modifying factors   Patient is a 26-year-old female with a history of hyperemesis gravidarum, she is a G1, P1 and denies any chance being pregnant at this time the presents with nausea vomiting began at 9:00 last evening. Patient said that she was drinking over the weekend and started having some epigastric pain with nausea and vomiting and has not been able to control vomiting overnight. Patient has Zofran at home however did not provide any relief. The patient has been having some chills and said that she has been persistently vomiting. Patient denies any other aggravating alleviating factors. Patient denies any blood in her vomitus. Patient has also had some diarrhea. Patient denies being a smoker, she is social drinker and did note that she drank little bit more this weekend, and denies any marijuana use. Patient's not working at this time and takes care of her 1year-old. Patient was previously on birth control however has been off of it but her periods remain irregular. Patient notes she has a history of breast reduction but no intra-abdominal surgeries in the past.          PCP: None    Current Facility-Administered Medications   Medication Dose Route Frequency Provider Last Rate Last Admin    ondansetron (ZOFRAN) injection 4 mg  4 mg IntraVENous NOW Wisam Frazier MD        famotidine (PF) (PEPCID) injection 20 mg  20 mg IntraVENous NOW Wisam Frazier MD         Current Outpatient Medications   Medication Sig Dispense Refill    methylPREDNISolone (Medrol, Leland,) 4 mg tablet Follow package instructions.  1 Dose Pack 0    levonorgestreL (Mirena) 20 mcg/24 hours (6 yrs) 52 mg IUD 1 Device by IntraUTERine route once. Past History     Past Medical History:  No past medical history on file. Past Surgical History:  No past surgical history on file. Family History:  No family history on file. Social History:  Social History     Tobacco Use    Smoking status: Never Smoker    Smokeless tobacco: Never Used   Substance Use Topics    Alcohol use: No    Drug use: No       Allergies: Allergies   Allergen Reactions    Flagyl [Metronidazole] Anaphylaxis    Reglan [Metoclopramide] Swelling         Review of Systems       Review of Systems   Constitutional: Positive for chills. Negative for activity change, fatigue and fever. HENT: Negative for congestion and rhinorrhea. Eyes: Negative for visual disturbance. Respiratory: Negative for shortness of breath. Cardiovascular: Negative for chest pain and palpitations. Gastrointestinal: Positive for diarrhea, nausea and vomiting. Negative for abdominal pain. Genitourinary: Negative for dysuria and hematuria. Musculoskeletal: Negative for back pain. Skin: Negative for rash. Neurological: Negative for dizziness, weakness and light-headedness. All other systems reviewed and are negative. Physical Exam     Visit Vitals  /78   Pulse (!) 53   Temp 98.4 °F (36.9 °C)   Resp 16   Ht 5' 2\" (1.575 m)   Wt 71.7 kg (158 lb)   SpO2 98%   BMI 28.90 kg/m²         Physical Exam  Vitals and nursing note reviewed. Constitutional:       General: She is not in acute distress. Appearance: She is well-developed. Comments: Has chills   HENT:      Head: Normocephalic and atraumatic. Right Ear: External ear normal.      Left Ear: External ear normal.      Nose: Nose normal.   Eyes:      General: No scleral icterus. Conjunctiva/sclera: Conjunctivae normal.      Pupils: Pupils are equal, round, and reactive to light. Neck:      Thyroid: No thyromegaly. Vascular: No JVD. Trachea: No tracheal deviation.    Cardiovascular: Rate and Rhythm: Normal rate and regular rhythm. Heart sounds: Normal heart sounds. No murmur heard. No friction rub. No gallop. Pulmonary:      Effort: Pulmonary effort is normal.      Breath sounds: Normal breath sounds. Chest:      Chest wall: No tenderness. Abdominal:      General: Bowel sounds are normal. There is no distension. Palpations: Abdomen is soft. Tenderness: There is abdominal tenderness in the right lower quadrant and epigastric area. There is no guarding or rebound. Comments: Soft no guarding   Musculoskeletal:         General: No tenderness. Normal range of motion. Cervical back: Normal range of motion and neck supple. Lymphadenopathy:      Cervical: No cervical adenopathy. Skin:     General: Skin is warm and dry. Neurological:      Mental Status: She is alert and oriented to person, place, and time. Cranial Nerves: No cranial nerve deficit. Coordination: Coordination normal.      Comments: No sensory loss, Gait normal, Motor 5/5   Psychiatric:         Behavior: Behavior normal.         Thought Content: Thought content normal.         Judgment: Judgment normal.      Comments: Supportive mother at the bedside           Diagnostic Study Results     Labs -  Recent Results (from the past 12 hour(s))   CBC WITH AUTOMATED DIFF    Collection Time: 04/25/22  6:11 AM   Result Value Ref Range    WBC 8.5 4.6 - 13.2 K/uL    RBC 4.90 4.20 - 5.30 M/uL    HGB 14.6 12.0 - 16.0 g/dL    HCT 43.0 35.0 - 45.0 %    MCV 87.8 78.0 - 100.0 FL    MCH 29.8 24.0 - 34.0 PG    MCHC 34.0 31.0 - 37.0 g/dL    RDW 12.2 11.6 - 14.5 %    PLATELET 549 005 - 927 K/uL    MPV 10.8 9.2 - 11.8 FL    NRBC 0.0 0  WBC    ABSOLUTE NRBC 0.00 0.00 - 0.01 K/uL    NEUTROPHILS 85 (H) 40 - 73 %    LYMPHOCYTES 11 (L) 21 - 52 %    MONOCYTES 3 3 - 10 %    EOSINOPHILS 0 0 - 5 %    BASOPHILS 0 0 - 2 %    IMMATURE GRANULOCYTES 0 0.0 - 0.5 %    ABS. NEUTROPHILS 7.3 1.8 - 8.0 K/UL    ABS.  LYMPHOCYTES 0.9 0.9 - 3.6 K/UL    ABS. MONOCYTES 0.3 0.05 - 1.2 K/UL    ABS. EOSINOPHILS 0.0 0.0 - 0.4 K/UL    ABS. BASOPHILS 0.0 0.0 - 0.1 K/UL    ABS. IMM. GRANS. 0.0 0.00 - 0.04 K/UL    DF AUTOMATED     METABOLIC PANEL, COMPREHENSIVE    Collection Time: 04/25/22  6:11 AM   Result Value Ref Range    Sodium 143 136 - 145 mmol/L    Potassium 3.8 3.5 - 5.5 mmol/L    Chloride 110 100 - 111 mmol/L    CO2 26 21 - 32 mmol/L    Anion gap 7 3.0 - 18 mmol/L    Glucose 111 (H) 74 - 99 mg/dL    BUN 12 7.0 - 18 MG/DL    Creatinine 0.95 0.6 - 1.3 MG/DL    BUN/Creatinine ratio 13 12 - 20      GFR est AA >60 >60 ml/min/1.73m2    GFR est non-AA >60 >60 ml/min/1.73m2    Calcium 10.1 8.5 - 10.1 MG/DL    Bilirubin, total 0.5 0.2 - 1.0 MG/DL    ALT (SGPT) 22 13 - 56 U/L    AST (SGOT) 20 10 - 38 U/L    Alk. phosphatase 76 45 - 117 U/L    Protein, total 8.6 (H) 6.4 - 8.2 g/dL    Albumin 4.4 3.4 - 5.0 g/dL    Globulin 4.2 (H) 2.0 - 4.0 g/dL    A-G Ratio 1.0 0.8 - 1.7     LIPASE    Collection Time: 04/25/22  6:11 AM   Result Value Ref Range    Lipase 93 73 - 393 U/L       Radiologic Studies -   No orders to display         Medical Decision Making   I am the first provider for this patient. I reviewed the vital signs, available nursing notes, past medical history, past surgical history, family history and social history. Vital Signs-Reviewed the patient's vital signs. Records Reviewed: Nursing Notes, Old Medical Records, Previous Radiology Studies and Previous Laboratory Studies (Time of Review: 7:12 AM)    ED Course: Progress Notes, Reevaluation, and Consults:      Patient's labs were reviewed and after hydration the patient is feeling much better. The patient like to go home and will have her follow closely as an outpatient. The patient will return if at all worsened or concerned. Workup and recommendations were reviewed with the patient and all questions were answered.   The patient understands the plan and will proceed with close outpatient care. I have encouraged the patient to return if at all worsened or concerned. David Uriostegui, DO 6:05 PM      Provider Notes (Medical Decision Making):   MDM  Number of Diagnoses or Management Options  Diagnosis management comments: Patient is a 62-year-old female with a history of hyperemesis gravidarum and subsequent recurrent vomiting since having her child 3 years ago on intermittent Zofran at home, the presents emergency department with complaint of nausea vomiting that began at 9:00 last night. Emergency department the patient has some chills and her last menstrual period has been over 30 days ago however she said that there is no chance of being pregnant because she is not sexually active. Patient is some mild epigastric and right lower quadrant pain but abdomen is soft. Patient did admit to drinking alcohol over the weekend and in the history attributed some of her symptoms to that. We will start IV fluids, supportive care, follow her lipase, LFTs, urinalysis, urine pregnancy, and then reevaluate. David Uriostegui, DO 7:16 AM        Procedures    Diagnosis     Clinical Impression:   1. Nausea and vomiting, unspecified vomiting type    2. Pyuria    3. Ketonuria        Disposition: Dc    Follow-up Information    None          Patient's Medications   Start Taking    No medications on file   Continue Taking    LEVONORGESTREL (MIRENA) 20 MCG/24 HOURS (6 YRS) 52 MG IUD    1 Device by IntraUTERine route once. METHYLPREDNISOLONE (MEDROL, AFIA,) 4 MG TABLET    Follow package instructions. These Medications have changed    No medications on file   Stop Taking    No medications on file     Disclaimer: Sections of this note are dictated using utilizing voice recognition software. Minor typographical errors may be present. If questions arise, please do not hesitate to contact me or call our department.

## 2022-04-25 NOTE — DISCHARGE INSTRUCTIONS
Make sure that you hydrate yourself well over the next 2 days. I will start her on antibiotics because there is some inflammation in her urine. Please make sure to return if you have any fevers, chills, side pain, or at all concerned.

## 2022-04-28 LAB
BACTERIA SPEC CULT: NORMAL
CC UR VC: NORMAL
SERVICE CMNT-IMP: NORMAL

## 2022-05-26 ENCOUNTER — HOSPITAL ENCOUNTER (EMERGENCY)
Age: 27
Discharge: LEFT BEFORE DISCHARGE | End: 2022-05-26
Payer: MEDICAID

## 2022-05-26 VITALS
SYSTOLIC BLOOD PRESSURE: 131 MMHG | OXYGEN SATURATION: 98 % | HEART RATE: 60 BPM | DIASTOLIC BLOOD PRESSURE: 86 MMHG | TEMPERATURE: 98.7 F | RESPIRATION RATE: 18 BRPM

## 2022-05-26 PROCEDURE — 75810000275 HC EMERGENCY DEPT VISIT NO LEVEL OF CARE

## 2022-05-27 NOTE — ED NOTES
During triage patient stated that she needed IV medicine for her nausea and vomiting that she has chronically. She takes Zofran and Phenergan at home and she states the IV medicine is what she needs right now. Patient has a migraine with nausea and vomiting I advised treatment with p.o. meds and patient did not want that treatment she wanted to wait for room in the back. I informed her that there would be a wait to get a room in the back but we would work quickly to take care of her and she did not want to stay she left in the middle of triage.     Cammie Pop ENP-C, FNP-C

## 2022-06-06 ENCOUNTER — HOSPITAL ENCOUNTER (EMERGENCY)
Age: 27
Discharge: HOME | End: 2022-06-06
Attending: STUDENT IN AN ORGANIZED HEALTH CARE EDUCATION/TRAINING PROGRAM
Payer: MEDICAID

## 2022-06-06 VITALS
HEIGHT: 62 IN | DIASTOLIC BLOOD PRESSURE: 82 MMHG | HEART RATE: 87 BPM | SYSTOLIC BLOOD PRESSURE: 124 MMHG | WEIGHT: 164 LBS | RESPIRATION RATE: 20 BRPM | BODY MASS INDEX: 30.18 KG/M2 | TEMPERATURE: 97 F | OXYGEN SATURATION: 100 %

## 2022-06-06 DIAGNOSIS — R11.2 NAUSEA AND VOMITING, UNSPECIFIED VOMITING TYPE: Primary | ICD-10-CM

## 2022-06-06 LAB
ALBUMIN SERPL-MCNC: 3.9 G/DL (ref 3.4–5)
ALBUMIN/GLOB SERPL: 1 {RATIO} (ref 0.8–1.7)
ALP SERPL-CCNC: 69 U/L (ref 45–117)
ALT SERPL-CCNC: 19 U/L (ref 13–56)
ANION GAP SERPL CALC-SCNC: 5 MMOL/L (ref 3–18)
AST SERPL-CCNC: 17 U/L (ref 10–38)
BASOPHILS # BLD: 0 K/UL (ref 0–0.1)
BASOPHILS NFR BLD: 0 % (ref 0–2)
BILIRUB SERPL-MCNC: 0.3 MG/DL (ref 0.2–1)
BUN SERPL-MCNC: 10 MG/DL (ref 7–18)
BUN/CREAT SERPL: 10 (ref 12–20)
CALCIUM SERPL-MCNC: 9.3 MG/DL (ref 8.5–10.1)
CHLORIDE SERPL-SCNC: 111 MMOL/L (ref 100–111)
CO2 SERPL-SCNC: 28 MMOL/L (ref 21–32)
CREAT SERPL-MCNC: 0.97 MG/DL (ref 0.6–1.3)
DIFFERENTIAL METHOD BLD: NORMAL
EOSINOPHIL # BLD: 0 K/UL (ref 0–0.4)
EOSINOPHIL NFR BLD: 0 % (ref 0–5)
ERYTHROCYTE [DISTWIDTH] IN BLOOD BY AUTOMATED COUNT: 12.3 % (ref 11.6–14.5)
GLOBULIN SER CALC-MCNC: 4.1 G/DL (ref 2–4)
GLUCOSE SERPL-MCNC: 95 MG/DL (ref 74–99)
HCG SERPL QL: NEGATIVE
HCT VFR BLD AUTO: 42.4 % (ref 35–45)
HGB BLD-MCNC: 14.1 G/DL (ref 12–16)
IMM GRANULOCYTES # BLD AUTO: 0 K/UL (ref 0–0.04)
IMM GRANULOCYTES NFR BLD AUTO: 0 % (ref 0–0.5)
LIPASE SERPL-CCNC: 114 U/L (ref 73–393)
LYMPHOCYTES # BLD: 1.5 K/UL (ref 0.9–3.6)
LYMPHOCYTES NFR BLD: 31 % (ref 21–52)
MCH RBC QN AUTO: 29.7 PG (ref 24–34)
MCHC RBC AUTO-ENTMCNC: 33.3 G/DL (ref 31–37)
MCV RBC AUTO: 89.3 FL (ref 78–100)
MONOCYTES # BLD: 0.4 K/UL (ref 0.05–1.2)
MONOCYTES NFR BLD: 7 % (ref 3–10)
NEUTS SEG # BLD: 3 K/UL (ref 1.8–8)
NEUTS SEG NFR BLD: 61 % (ref 40–73)
NRBC # BLD: 0 K/UL (ref 0–0.01)
NRBC BLD-RTO: 0 PER 100 WBC
PLATELET # BLD AUTO: 220 K/UL (ref 135–420)
PMV BLD AUTO: 11.3 FL (ref 9.2–11.8)
POTASSIUM SERPL-SCNC: 4 MMOL/L (ref 3.5–5.5)
PROT SERPL-MCNC: 8 G/DL (ref 6.4–8.2)
RBC # BLD AUTO: 4.75 M/UL (ref 4.2–5.3)
SODIUM SERPL-SCNC: 144 MMOL/L (ref 136–145)
WBC # BLD AUTO: 5 K/UL (ref 4.6–13.2)

## 2022-06-06 PROCEDURE — 74011250636 HC RX REV CODE- 250/636: Performed by: STUDENT IN AN ORGANIZED HEALTH CARE EDUCATION/TRAINING PROGRAM

## 2022-06-06 PROCEDURE — 96374 THER/PROPH/DIAG INJ IV PUSH: CPT

## 2022-06-06 PROCEDURE — 99284 EMERGENCY DEPT VISIT MOD MDM: CPT

## 2022-06-06 PROCEDURE — 83690 ASSAY OF LIPASE: CPT

## 2022-06-06 PROCEDURE — 84703 CHORIONIC GONADOTROPIN ASSAY: CPT

## 2022-06-06 PROCEDURE — 85025 COMPLETE CBC W/AUTO DIFF WBC: CPT

## 2022-06-06 PROCEDURE — 80053 COMPREHEN METABOLIC PANEL: CPT

## 2022-06-06 RX ORDER — ONDANSETRON 2 MG/ML
4 INJECTION INTRAMUSCULAR; INTRAVENOUS ONCE
Status: COMPLETED | OUTPATIENT
Start: 2022-06-06 | End: 2022-06-06

## 2022-06-06 RX ADMIN — ONDANSETRON 4 MG: 2 INJECTION INTRAMUSCULAR; INTRAVENOUS at 03:43

## 2022-06-06 RX ADMIN — SODIUM CHLORIDE 1000 ML: 9 INJECTION, SOLUTION INTRAVENOUS at 03:46

## 2022-06-06 NOTE — ED PROVIDER NOTES
EMERGENCY DEPARTMENT HISTORY AND PHYSICAL EXAM    4:19 AM      Date: 6/6/2022  Patient Name: Inocencia Rivas    History of Presenting Illness     Chief Complaint   Patient presents with    Vomiting         History Provided By: Patient  Location/Duration/Severity/Modifying factors   Patient is a 61-year-old female who denies any pertinent past medical history presenting due to nausea vomiting. Patient states that earlier today she went out and had some drinks with her friends and then after she went out then she started having nausea and vomiting. Patient continued to feel nauseous with emesis with a believe that she is just having a bad hangover so presented to the emergency department treatment. Patient denies any infectious symptoms such as fever chills or diarrhea. PCP: None    Current Facility-Administered Medications   Medication Dose Route Frequency Provider Last Rate Last Admin    sodium chloride 0.9 % bolus infusion 1,000 mL  1,000 mL IntraVENous Humberto Hi MD 1,000 mL/hr at 06/06/22 0346 1,000 mL at 06/06/22 0346     Current Outpatient Medications   Medication Sig Dispense Refill    ondansetron (ZOFRAN ODT) 8 mg disintegrating tablet Take 1 Tablet by mouth every eight (8) hours as needed for Nausea for up to 12 doses. 12 Tablet 0    levonorgestreL (Mirena) 20 mcg/24 hours (6 yrs) 52 mg IUD 1 Device by IntraUTERine route once. Past History     Past Medical History:  History reviewed. No pertinent past medical history. Past Surgical History:  History reviewed. No pertinent surgical history. Family History:  History reviewed. No pertinent family history. Social History:  Social History     Tobacco Use    Smoking status: Never Smoker    Smokeless tobacco: Never Used   Substance Use Topics    Alcohol use: No    Drug use: No       Allergies:   Allergies   Allergen Reactions    Flagyl [Metronidazole] Anaphylaxis    Reglan [Metoclopramide] Swelling         Review of Systems       Review of Systems   Constitutional: Negative for activity change, chills and fever. Respiratory: Negative for chest tightness and shortness of breath. Cardiovascular: Negative for chest pain. Gastrointestinal: Positive for nausea and vomiting. Negative for diarrhea. Genitourinary: Negative for difficulty urinating and dysuria. Skin: Negative. Physical Exam     Visit Vitals  BP (!) 130/99 (BP 1 Location: Left upper arm, BP Patient Position: At rest)   Pulse (!) 107   Temp 97 °F (36.1 °C)   Resp 20   Ht 5' 2\" (1.575 m)   Wt 74.4 kg (164 lb)   SpO2 100%   BMI 30.00 kg/m²         Physical Exam  Vitals and nursing note reviewed. Constitutional:       Appearance: She is well-developed. HENT:      Head: Normocephalic and atraumatic. Eyes:      Extraocular Movements: Extraocular movements intact. Neck:      Thyroid: No thyromegaly. Vascular: No JVD. Trachea: No tracheal deviation. Cardiovascular:      Rate and Rhythm: Normal rate and regular rhythm. Heart sounds: Normal heart sounds. No murmur heard. No gallop. Pulmonary:      Effort: Pulmonary effort is normal.      Breath sounds: Normal breath sounds. No wheezing, rhonchi or rales. Abdominal:      General: Bowel sounds are normal. There is no distension. Palpations: Abdomen is soft. Tenderness: There is no abdominal tenderness. There is no guarding. Musculoskeletal:         General: Normal range of motion. Cervical back: Normal range of motion and neck supple. Skin:     General: Skin is warm and dry. Neurological:      General: No focal deficit present. Mental Status: She is alert and oriented to person, place, and time. Psychiatric:         Behavior: Behavior normal.         Thought Content:  Thought content normal.         Judgment: Judgment normal.           Diagnostic Study Results     Labs -  Recent Results (from the past 12 hour(s))   CBC WITH AUTOMATED DIFF    Collection Time: 06/06/22  3:45 AM   Result Value Ref Range    WBC 5.0 4.6 - 13.2 K/uL    RBC 4.75 4.20 - 5.30 M/uL    HGB 14.1 12.0 - 16.0 g/dL    HCT 42.4 35.0 - 45.0 %    MCV 89.3 78.0 - 100.0 FL    MCH 29.7 24.0 - 34.0 PG    MCHC 33.3 31.0 - 37.0 g/dL    RDW 12.3 11.6 - 14.5 %    PLATELET 123 731 - 335 K/uL    MPV 11.3 9.2 - 11.8 FL    NRBC 0.0 0  WBC    ABSOLUTE NRBC 0.00 0.00 - 0.01 K/uL    NEUTROPHILS 61 40 - 73 %    LYMPHOCYTES 31 21 - 52 %    MONOCYTES 7 3 - 10 %    EOSINOPHILS 0 0 - 5 %    BASOPHILS 0 0 - 2 %    IMMATURE GRANULOCYTES 0 0.0 - 0.5 %    ABS. NEUTROPHILS 3.0 1.8 - 8.0 K/UL    ABS. LYMPHOCYTES 1.5 0.9 - 3.6 K/UL    ABS. MONOCYTES 0.4 0.05 - 1.2 K/UL    ABS. EOSINOPHILS 0.0 0.0 - 0.4 K/UL    ABS. BASOPHILS 0.0 0.0 - 0.1 K/UL    ABS. IMM. GRANS. 0.0 0.00 - 0.04 K/UL    DF AUTOMATED     METABOLIC PANEL, COMPREHENSIVE    Collection Time: 06/06/22  3:45 AM   Result Value Ref Range    Sodium 144 136 - 145 mmol/L    Potassium 4.0 3.5 - 5.5 mmol/L    Chloride 111 100 - 111 mmol/L    CO2 28 21 - 32 mmol/L    Anion gap 5 3.0 - 18 mmol/L    Glucose 95 74 - 99 mg/dL    BUN 10 7.0 - 18 MG/DL    Creatinine 0.97 0.6 - 1.3 MG/DL    BUN/Creatinine ratio 10 (L) 12 - 20      GFR est AA >60 >60 ml/min/1.73m2    GFR est non-AA >60 >60 ml/min/1.73m2    Calcium 9.3 8.5 - 10.1 MG/DL    Bilirubin, total 0.3 0.2 - 1.0 MG/DL    ALT (SGPT) 19 13 - 56 U/L    AST (SGOT) 17 10 - 38 U/L    Alk. phosphatase 69 45 - 117 U/L    Protein, total 8.0 6.4 - 8.2 g/dL    Albumin 3.9 3.4 - 5.0 g/dL    Globulin 4.1 (H) 2.0 - 4.0 g/dL    A-G Ratio 1.0 0.8 - 1.7     LIPASE    Collection Time: 06/06/22  3:45 AM   Result Value Ref Range    Lipase 114 73 - 393 U/L       Radiologic Studies -   No orders to display         Medical Decision Making   I am the first provider for this patient. I reviewed the vital signs, available nursing notes, past medical history, past surgical history, family history and social history.     Vital Signs-Reviewed the patient's vital signs. EKG:     Records Reviewed: Nursing Notes (Time of Review: 4:19 AM)    ED Course: Progress Notes, Reevaluation, and Consults:         Provider Notes (Medical Decision Making):   MDM  Number of Diagnoses or Management Options  Diagnosis management comments: Patient is a 61-year-old female who denies any pertinent past medical history presenting due to nausea vomiting. Patient symptoms likely from alcohol use earlier in the evening. Patient clinically appears well with normal abdominal exam.  We will do labs to assess for any other intra-abdominal pathology such as pancreatitis, cholecystitis, diverticulitis or appendicitis but very unlikely with presenting history. If labs normal and can give patient symptoms under control likely will discharge home. Procedures    Critical Care Time:       Diagnosis     Clinical Impression: No diagnosis found. Disposition: Likely Home    Follow-up Information    None          Patient's Medications   Start Taking    No medications on file   Continue Taking    LEVONORGESTREL (MIRENA) 20 MCG/24 HOURS (6 YRS) 52 MG IUD    1 Device by IntraUTERine route once. ONDANSETRON (ZOFRAN ODT) 8 MG DISINTEGRATING TABLET    Take 1 Tablet by mouth every eight (8) hours as needed for Nausea for up to 12 doses. These Medications have changed    No medications on file   Stop Taking    No medications on file     Disclaimer: Sections of this note are dictated using utilizing voice recognition software. Minor typographical errors may be present. If questions arise, please do not hesitate to contact me or call our department.

## 2022-06-06 NOTE — ED TRIAGE NOTES
Pt to ED with complaints of vomiting and abdominal cramping that started yesterday. Pt reports having alcoholic beverages during the day time hours yesterday and now cant hold anything down and feels hung over.

## 2022-06-06 NOTE — DISCHARGE INSTRUCTIONS
You presented for nausea vomiting likely secondary to alcohol use. Please try to refrain from drinking so much in the future. You can return to the emergency department if return of symptoms or any other concerns.

## 2022-06-17 ENCOUNTER — HOSPITAL ENCOUNTER (EMERGENCY)
Age: 27
Discharge: HOME | End: 2022-06-17
Attending: EMERGENCY MEDICINE
Payer: MEDICAID

## 2022-06-17 ENCOUNTER — APPOINTMENT (OUTPATIENT)
Dept: GENERAL RADIOLOGY | Age: 27
End: 2022-06-17
Attending: PHYSICIAN ASSISTANT
Payer: MEDICAID

## 2022-06-17 VITALS
OXYGEN SATURATION: 100 % | WEIGHT: 164 LBS | HEART RATE: 90 BPM | BODY MASS INDEX: 30.18 KG/M2 | TEMPERATURE: 100.7 F | DIASTOLIC BLOOD PRESSURE: 71 MMHG | HEIGHT: 62 IN | SYSTOLIC BLOOD PRESSURE: 127 MMHG | RESPIRATION RATE: 16 BRPM

## 2022-06-17 DIAGNOSIS — R11.2 NAUSEA AND VOMITING, UNSPECIFIED VOMITING TYPE: ICD-10-CM

## 2022-06-17 DIAGNOSIS — U07.1 COVID-19: Primary | ICD-10-CM

## 2022-06-17 LAB
ALBUMIN SERPL-MCNC: 4.1 G/DL (ref 3.4–5)
ALBUMIN/GLOB SERPL: 0.9 {RATIO} (ref 0.8–1.7)
ALP SERPL-CCNC: 76 U/L (ref 45–117)
ALT SERPL-CCNC: 20 U/L (ref 13–56)
ANION GAP SERPL CALC-SCNC: 4 MMOL/L (ref 3–18)
APPEARANCE UR: CLEAR
AST SERPL-CCNC: 13 U/L (ref 10–38)
BASOPHILS # BLD: 0 K/UL (ref 0–0.1)
BASOPHILS NFR BLD: 0 % (ref 0–2)
BILIRUB SERPL-MCNC: 0.4 MG/DL (ref 0.2–1)
BILIRUB UR QL: NEGATIVE
BUN SERPL-MCNC: 7 MG/DL (ref 7–18)
BUN/CREAT SERPL: 7 (ref 12–20)
CALCIUM SERPL-MCNC: 9.6 MG/DL (ref 8.5–10.1)
CHLORIDE SERPL-SCNC: 108 MMOL/L (ref 100–111)
CO2 SERPL-SCNC: 27 MMOL/L (ref 21–32)
COLOR UR: YELLOW
CREAT SERPL-MCNC: 1.05 MG/DL (ref 0.6–1.3)
DIFFERENTIAL METHOD BLD: ABNORMAL
EOSINOPHIL # BLD: 0 K/UL (ref 0–0.4)
EOSINOPHIL NFR BLD: 0 % (ref 0–5)
EPITH CASTS URNS QL MICRO: NORMAL /LPF (ref 0–5)
ERYTHROCYTE [DISTWIDTH] IN BLOOD BY AUTOMATED COUNT: 12 % (ref 11.6–14.5)
FLUAV RNA SPEC QL NAA+PROBE: NOT DETECTED
FLUBV RNA SPEC QL NAA+PROBE: NOT DETECTED
GLOBULIN SER CALC-MCNC: 4.5 G/DL (ref 2–4)
GLUCOSE SERPL-MCNC: 86 MG/DL (ref 74–99)
GLUCOSE UR STRIP.AUTO-MCNC: NEGATIVE MG/DL
HCG UR QL: NEGATIVE
HCT VFR BLD AUTO: 45.6 % (ref 35–45)
HGB BLD-MCNC: 15 G/DL (ref 12–16)
HGB UR QL STRIP: ABNORMAL
IMM GRANULOCYTES # BLD AUTO: 0 K/UL (ref 0–0.04)
IMM GRANULOCYTES NFR BLD AUTO: 0 % (ref 0–0.5)
KETONES UR QL STRIP.AUTO: NEGATIVE MG/DL
LEUKOCYTE ESTERASE UR QL STRIP.AUTO: NEGATIVE
LIPASE SERPL-CCNC: 80 U/L (ref 73–393)
LYMPHOCYTES # BLD: 0.4 K/UL (ref 0.9–3.6)
LYMPHOCYTES NFR BLD: 10 % (ref 21–52)
MCH RBC QN AUTO: 29.4 PG (ref 24–34)
MCHC RBC AUTO-ENTMCNC: 32.9 G/DL (ref 31–37)
MCV RBC AUTO: 89.4 FL (ref 78–100)
MONOCYTES # BLD: 0.5 K/UL (ref 0.05–1.2)
MONOCYTES NFR BLD: 13 % (ref 3–10)
NEUTS SEG # BLD: 2.9 K/UL (ref 1.8–8)
NEUTS SEG NFR BLD: 77 % (ref 40–73)
NITRITE UR QL STRIP.AUTO: NEGATIVE
NRBC # BLD: 0 K/UL (ref 0–0.01)
NRBC BLD-RTO: 0 PER 100 WBC
PH UR STRIP: 7 [PH] (ref 5–8)
PLATELET # BLD AUTO: 207 K/UL (ref 135–420)
PMV BLD AUTO: 11 FL (ref 9.2–11.8)
POTASSIUM SERPL-SCNC: 3.7 MMOL/L (ref 3.5–5.5)
PROT SERPL-MCNC: 8.6 G/DL (ref 6.4–8.2)
PROT UR STRIP-MCNC: NEGATIVE MG/DL
RBC # BLD AUTO: 5.1 M/UL (ref 4.2–5.3)
RBC #/AREA URNS HPF: NORMAL /HPF (ref 0–5)
SARS-COV-2, COV2: DETECTED
SODIUM SERPL-SCNC: 139 MMOL/L (ref 136–145)
SP GR UR REFRACTOMETRY: 1.01 (ref 1–1.03)
UROBILINOGEN UR QL STRIP.AUTO: 1 EU/DL (ref 0.2–1)
WBC # BLD AUTO: 3.8 K/UL (ref 4.6–13.2)
WBC URNS QL MICRO: NORMAL /HPF (ref 0–4)

## 2022-06-17 PROCEDURE — 85025 COMPLETE CBC W/AUTO DIFF WBC: CPT

## 2022-06-17 PROCEDURE — 96374 THER/PROPH/DIAG INJ IV PUSH: CPT

## 2022-06-17 PROCEDURE — 87636 SARSCOV2 & INF A&B AMP PRB: CPT

## 2022-06-17 PROCEDURE — 80053 COMPREHEN METABOLIC PANEL: CPT

## 2022-06-17 PROCEDURE — 81001 URINALYSIS AUTO W/SCOPE: CPT

## 2022-06-17 PROCEDURE — 74011250636 HC RX REV CODE- 250/636: Performed by: PHYSICIAN ASSISTANT

## 2022-06-17 PROCEDURE — 81025 URINE PREGNANCY TEST: CPT

## 2022-06-17 PROCEDURE — 83690 ASSAY OF LIPASE: CPT

## 2022-06-17 PROCEDURE — 96361 HYDRATE IV INFUSION ADD-ON: CPT

## 2022-06-17 PROCEDURE — 74011250637 HC RX REV CODE- 250/637: Performed by: PHYSICIAN ASSISTANT

## 2022-06-17 PROCEDURE — 99284 EMERGENCY DEPT VISIT MOD MDM: CPT

## 2022-06-17 PROCEDURE — 71045 X-RAY EXAM CHEST 1 VIEW: CPT

## 2022-06-17 RX ORDER — ACETAMINOPHEN 500 MG
500 TABLET ORAL
Status: COMPLETED | OUTPATIENT
Start: 2022-06-17 | End: 2022-06-17

## 2022-06-17 RX ORDER — ONDANSETRON 4 MG/1
4 TABLET, FILM COATED ORAL
Qty: 12 TABLET | Refills: 0 | OUTPATIENT
Start: 2022-06-17 | End: 2022-09-07

## 2022-06-17 RX ORDER — KETOROLAC TROMETHAMINE 15 MG/ML
15 INJECTION, SOLUTION INTRAMUSCULAR; INTRAVENOUS
Status: DISCONTINUED | OUTPATIENT
Start: 2022-06-17 | End: 2022-06-18 | Stop reason: HOSPADM

## 2022-06-17 RX ORDER — HYDROCODONE BITARTRATE AND ACETAMINOPHEN 5; 325 MG/1; MG/1
1 TABLET ORAL
Status: COMPLETED | OUTPATIENT
Start: 2022-06-17 | End: 2022-06-17

## 2022-06-17 RX ORDER — ONDANSETRON 2 MG/ML
4 INJECTION INTRAMUSCULAR; INTRAVENOUS ONCE
Status: COMPLETED | OUTPATIENT
Start: 2022-06-17 | End: 2022-06-17

## 2022-06-17 RX ADMIN — ONDANSETRON 4 MG: 2 INJECTION INTRAMUSCULAR; INTRAVENOUS at 20:35

## 2022-06-17 RX ADMIN — SODIUM CHLORIDE 1000 ML: 900 INJECTION, SOLUTION INTRAVENOUS at 20:36

## 2022-06-17 RX ADMIN — ACETAMINOPHEN 500 MG: 500 TABLET ORAL at 20:36

## 2022-06-17 RX ADMIN — HYDROCODONE BITARTRATE AND ACETAMINOPHEN 1 TABLET: 5; 325 TABLET ORAL at 21:06

## 2022-06-17 NOTE — Clinical Note
[address] [place] [address] [facility] EMERGENCY DEPT  [id number] [address] [phone]    Work/School Note    Date: 6/17/2022     To Whom It May concern:    Kyung Foote was evaluated by the following provider(s):  Attending Provider: Hardik Chávez MD  Physician Assistant: Juanita Leary virus is suspected. Per the CDC guidelines we recommend home isolation until the following conditions are all met:    1. At least five days have passed since symptoms first appeared and/or had a close exposure,   2. After home isolation for five days, wearing a mask around others for the next five days,  3. At least 24 have passed since last fever without the use of fever-reducing medications and  4.  Symptoms (eg cough, shortness of breath) have improved      Sincerely,          Paramjit Alves PA-C

## 2022-06-17 NOTE — ED TRIAGE NOTES
PT arrived with c/o flu like symptoms. Reports no smell for 3 days as well as n/v.  Pt also reports body pains and headache.

## 2022-06-17 NOTE — ED PROVIDER NOTES
EMERGENCY DEPARTMENT HISTORY AND PHYSICAL EXAM    Date: 6/17/2022  Patient Name: Kody Calvillo    History of Presenting Illness     Chief Complaint   Patient presents with    Flu         History Provided By: Patient    Chief Complaint: Loss of smell, body aches, vomiting, headache  Duration: 3 days  Timing:    Location: \"all over\"  Quality:   Severity:   Modifying Factors:   Associated Symptoms: none       Additional History (Context): Kody Calvillo is a 27 y.o. female with a history of COVID last year presents for evaluation of 3 days of loss of smell, body aches, vomiting, headache, low back pain. Urinary frequency. Still has taste. Unknown if fever, but having hot and cold flashes. States no chance of pregnancy. No sick contacts, no recent travel. No COVID testing done. Had COVID last year and feels the same. PCP: None    Current Outpatient Medications   Medication Sig Dispense Refill    methocarbamoL (ROBAXIN) 500 mg tablet Take 1 Tablet by mouth three (3) times daily as needed for Muscle Spasm(s). 20 Tablet 0    ondansetron hcl (Zofran) 4 mg tablet Take 1 Tablet by mouth every eight (8) hours as needed for Nausea. 12 Tablet 0    ondansetron (ZOFRAN ODT) 8 mg disintegrating tablet Take 1 Tablet by mouth every eight (8) hours as needed for Nausea for up to 12 doses. 12 Tablet 0    levonorgestreL (Mirena) 20 mcg/24 hours (6 yrs) 52 mg IUD 1 Device by IntraUTERine route once. Past History     Past Medical History:  No past medical history on file. Past Surgical History:  No past surgical history on file. Family History:  No family history on file. Social History:  Social History     Tobacco Use    Smoking status: Never Smoker    Smokeless tobacco: Never Used   Substance Use Topics    Alcohol use: No    Drug use: No       Allergies:   Allergies   Allergen Reactions    Flagyl [Metronidazole] Anaphylaxis    Reglan [Metoclopramide] Swelling         Review of Systems   Review of Systems Constitutional: Positive for chills, fatigue and fever. HENT: Positive for congestion. Negative for sore throat. Respiratory: Negative for cough and shortness of breath. Cardiovascular: Negative for chest pain and palpitations. Gastrointestinal: Positive for nausea and vomiting. Negative for abdominal pain and diarrhea. Musculoskeletal: Positive for back pain and myalgias. Skin: Negative for rash. Neurological: Positive for headaches. Negative for dizziness and weakness. All Other Systems Negative  Physical Exam     Vitals:    06/17/22 1940 06/17/22 2057   BP: 127/71    Pulse: 90    Resp: 16    Temp: (!) 101.5 °F (38.6 °C) (!) 100.7 °F (38.2 °C)   SpO2: 100%    Weight: 74.4 kg (164 lb)    Height: 5' 2\" (1.575 m)      Physical Exam  Constitutional:       General: She is not in acute distress. Appearance: Normal appearance. She is not ill-appearing or toxic-appearing. HENT:      Head: Normocephalic and atraumatic. Right Ear: Tympanic membrane and external ear normal.      Left Ear: Tympanic membrane, ear canal and external ear normal.      Ears:      Comments: Small plastic piece in the right TM, from her tragus piercing      Nose: Nose normal.      Mouth/Throat:      Mouth: Mucous membranes are moist.      Pharynx: Oropharynx is clear. Eyes:      Extraocular Movements: Extraocular movements intact. Conjunctiva/sclera: Conjunctivae normal.   Cardiovascular:      Rate and Rhythm: Normal rate and regular rhythm. Pulses: Normal pulses. Heart sounds: Normal heart sounds. Pulmonary:      Effort: Pulmonary effort is normal.      Breath sounds: Normal breath sounds. Abdominal:      Palpations: Abdomen is soft. Tenderness: There is no abdominal tenderness. There is no right CVA tenderness, left CVA tenderness, guarding or rebound. Musculoskeletal:         General: Normal range of motion. Cervical back: Normal range of motion.       Lumbar back: Tenderness present. No spasms or bony tenderness. Normal range of motion. No scoliosis. Back:    Lymphadenopathy:      Cervical: No cervical adenopathy. Skin:     General: Skin is warm. Capillary Refill: Capillary refill takes less than 2 seconds. Neurological:      General: No focal deficit present. Mental Status: She is alert and oriented to person, place, and time. GCS: GCS eye subscore is 4. GCS verbal subscore is 5. GCS motor subscore is 6. Cranial Nerves: Cranial nerves are intact. Sensory: Sensation is intact. Motor: Motor function is intact. Coordination: Coordination is intact. Gait: Gait is intact. Psychiatric:         Mood and Affect: Mood normal.         Behavior: Behavior normal.           Diagnostic Study Results     Labs -     Recent Results (from the past 12 hour(s))   COVID-19 WITH INFLUENZA A/B    Collection Time: 06/17/22  7:46 PM   Result Value Ref Range    SARS-CoV-2 by PCR Detected (AA) NOTD      Influenza A by PCR Not detected NOTD      Influenza B by PCR Not detected NOTD     CBC WITH AUTOMATED DIFF    Collection Time: 06/17/22  8:30 PM   Result Value Ref Range    WBC 3.8 (L) 4.6 - 13.2 K/uL    RBC 5.10 4.20 - 5.30 M/uL    HGB 15.0 12.0 - 16.0 g/dL    HCT 45.6 (H) 35.0 - 45.0 %    MCV 89.4 78.0 - 100.0 FL    MCH 29.4 24.0 - 34.0 PG    MCHC 32.9 31.0 - 37.0 g/dL    RDW 12.0 11.6 - 14.5 %    PLATELET 864 967 - 380 K/uL    MPV 11.0 9.2 - 11.8 FL    NRBC 0.0 0  WBC    ABSOLUTE NRBC 0.00 0.00 - 0.01 K/uL    NEUTROPHILS 77 (H) 40 - 73 %    LYMPHOCYTES 10 (L) 21 - 52 %    MONOCYTES 13 (H) 3 - 10 %    EOSINOPHILS 0 0 - 5 %    BASOPHILS 0 0 - 2 %    IMMATURE GRANULOCYTES 0 0.0 - 0.5 %    ABS. NEUTROPHILS 2.9 1.8 - 8.0 K/UL    ABS. LYMPHOCYTES 0.4 (L) 0.9 - 3.6 K/UL    ABS. MONOCYTES 0.5 0.05 - 1.2 K/UL    ABS. EOSINOPHILS 0.0 0.0 - 0.4 K/UL    ABS. BASOPHILS 0.0 0.0 - 0.1 K/UL    ABS. IMM.  GRANS. 0.0 0.00 - 0.04 K/UL    DF AUTOMATED     METABOLIC PANEL, COMPREHENSIVE    Collection Time: 06/17/22  8:30 PM   Result Value Ref Range    Sodium 139 136 - 145 mmol/L    Potassium 3.7 3.5 - 5.5 mmol/L    Chloride 108 100 - 111 mmol/L    CO2 27 21 - 32 mmol/L    Anion gap 4 3.0 - 18 mmol/L    Glucose 86 74 - 99 mg/dL    BUN 7 7.0 - 18 MG/DL    Creatinine 1.05 0.6 - 1.3 MG/DL    BUN/Creatinine ratio 7 (L) 12 - 20      GFR est AA >60 >60 ml/min/1.73m2    GFR est non-AA >60 >60 ml/min/1.73m2    Calcium 9.6 8.5 - 10.1 MG/DL    Bilirubin, total 0.4 0.2 - 1.0 MG/DL    ALT (SGPT) 20 13 - 56 U/L    AST (SGOT) 13 10 - 38 U/L    Alk. phosphatase 76 45 - 117 U/L    Protein, total 8.6 (H) 6.4 - 8.2 g/dL    Albumin 4.1 3.4 - 5.0 g/dL    Globulin 4.5 (H) 2.0 - 4.0 g/dL    A-G Ratio 0.9 0.8 - 1.7     LIPASE    Collection Time: 06/17/22  8:30 PM   Result Value Ref Range    Lipase 80 73 - 393 U/L   URINALYSIS W/ RFLX MICROSCOPIC    Collection Time: 06/17/22  8:30 PM   Result Value Ref Range    Color YELLOW      Appearance CLEAR      Specific gravity 1.015 1.005 - 1.030      pH (UA) 7.0 5.0 - 8.0      Protein Negative NEG mg/dL    Glucose Negative NEG mg/dL    Ketone Negative NEG mg/dL    Bilirubin Negative NEG      Blood TRACE (A) NEG      Urobilinogen 1.0 0.2 - 1.0 EU/dL    Nitrites Negative NEG      Leukocyte Esterase Negative NEG     HCG URINE, QL    Collection Time: 06/17/22  8:30 PM   Result Value Ref Range    HCG urine, QL Negative NEG     URINE MICROSCOPIC ONLY    Collection Time: 06/17/22  8:30 PM   Result Value Ref Range    WBC 0 to 3 0 - 4 /hpf    RBC 0 to 3 0 - 5 /hpf    Epithelial cells FEW 0 - 5 /lpf       Radiologic Studies -   XR CHEST PORT   Final Result   1. No acute process           CT Results  (Last 48 hours)    None        CXR Results  (Last 48 hours)               06/17/22 5154  XR CHEST PORT Final result    Impression:  1.   No acute process           Narrative:      Examination: Portable AP chest        History: Cough and fever       Comparison: February 14, 2021 Findings: The lungs are clear. The heart is normal in size. There is no definite   acute osseous abnormality. Medical Decision Making   I am the first provider for this patient. I reviewed the vital signs, available nursing notes, past medical history, past surgical history, family history and social history. Vital Signs-Reviewed the patient's vital signs. Records Reviewed: Nursing Notes and Old Medical Records     Procedures: None   Procedures    Provider Notes (Medical Decision Making):     Patient febrile on initial exam.  Will administer Tylenol. Will start fluids, evaluate for cause of fever. Physical exam normal other than low back tenderness. Loss of smell, body aches, fever and muscular pain suspect COVID. Will test for COVID and flu. Patient still complaining of back pain, attempted to give Toradol but patient states that this makes her feel funny. Norco added. Patient is positive for COVID at this time. Chest x-ray negative. Discussed positive test, recommend quarantine at home especially while having fever. Stay hydrated, treat fever at home. His requesting something for her back pain. We will send in Robaxin for her. Recommended return to the emergency department if any worsening such as chest pain, shortness of breath or cannot keep food or fluids down. Patient verbalized understanding, will follow up with primary care or urgent care as she states that she will need a test of cure for her job. No further question at this time. MED RECONCILIATION:  No current facility-administered medications for this encounter. Current Outpatient Medications   Medication Sig    methocarbamoL (ROBAXIN) 500 mg tablet Take 1 Tablet by mouth three (3) times daily as needed for Muscle Spasm(s).  ondansetron hcl (Zofran) 4 mg tablet Take 1 Tablet by mouth every eight (8) hours as needed for Nausea.     ondansetron (ZOFRAN ODT) 8 mg disintegrating tablet Take 1 Tablet by mouth every eight (8) hours as needed for Nausea for up to 12 doses.  levonorgestreL (Mirena) 20 mcg/24 hours (6 yrs) 52 mg IUD 1 Device by IntraUTERine route once. Disposition:  Home     DISCHARGE NOTE:   Pt has been reexamined. Patient has no new complaints, changes, or physical findings. Care plan outlined and precautions discussed. Results of workup were reviewed with the patient. All medications were reviewed with the patient. All of pt's questions and concerns were addressed. Patient was instructed and agrees to follow up with PCP as well as to return to the ED upon further deterioration. Patient is ready to go home. Follow-up Information     Follow up With Specialties Details Why [facility]    [facility] EMERGENCY DEPT Emergency Medicine  If symptoms worsen [address]  [phone]          Discharge Medication List as of 6/17/2022 10:27 PM      START taking these medications    Details   ondansetron hcl (Zofran) 4 mg tablet Take 1 Tablet by mouth every eight (8) hours as needed for Nausea., Normal, Disp-12 Tablet, R-0         CONTINUE these medications which have NOT CHANGED    Details   ondansetron (ZOFRAN ODT) 8 mg disintegrating tablet Take 1 Tablet by mouth every eight (8) hours as needed for Nausea for up to 12 doses. , Normal, Disp-12 Tablet, R-0      levonorgestreL (Mirena) 20 mcg/24 hours (6 yrs) 52 mg IUD 1 Device by IntraUTERine route once., Historical Med                 Diagnosis     Clinical Impression:   1. COVID-19    2. Nausea and vomiting, unspecified vomiting type          \"Please note that this dictation was completed with [employer], the [employer] voice recognition software. Quite often unanticipated grammatical, syntax, homophones, and other interpretive errors are inadvertently transcribed by the computer software. Please disregard these errors. Please excuse any errors that have escaped final proofreading. \"

## 2022-06-18 RX ORDER — METHOCARBAMOL 500 MG/1
500 TABLET, FILM COATED ORAL
Qty: 20 TABLET | Refills: 0 | OUTPATIENT
Start: 2022-06-18 | End: 2022-09-07

## 2022-06-18 NOTE — DISCHARGE INSTRUCTIONS
Your test is positive for COVID  You will need to quarantine for 5 days, and extend that time for 24 hours if you develop a fever  Use the zofran as needed for nausea/vomiting  Make sure to stay hydrated, can take tylenol or motrin for fever  Return to the ER if you develop worsening symptoms such as shortness of breath or chest pain

## 2022-06-18 NOTE — ED NOTES
Pt assessment upon discharge was stable  Pt discharged home  Education was completed   Verbal instruction was given   Pt understood and read back   Pt discharged with self

## 2022-06-20 ENCOUNTER — PATIENT OUTREACH (OUTPATIENT)
Dept: CASE MANAGEMENT | Age: 27
End: 2022-06-20

## 2022-06-20 NOTE — PROGRESS NOTES
Date/Time:  2022 1:47 PM   Call within 2 business days of discharge: Yes   Attempted to reach patient by telephone. Left HIPPA compliant message requesting a return call. Will attempt to reach patient again. 22  2:15 PM  Pt returned call at this time. Patient contacted regarding [id number] s/p [facility] ED visit on 22. Discussed COVID-19 related testing which was available at this time. Test results were positive. Patient informed of results, if available? yes. LPN Care Coordinator contacted the patient by telephone to perform post discharge assessment. Call within 2 business days of discharge: Yes Verified name and  with patient as identifiers. Provided introduction to self, and explanation of the CTN/ACM role, and reason for call due to risk factors for infection and/or exposure to COVID-19. Symptoms reviewed with patient who verbalized the following symptoms: no new symptoms and no worsening symptoms      Due to no new or worsening symptoms encounter was not routed to provider for escalation. Discussed follow-up appointments. If no appointment was previously scheduled, appointment scheduling offered:  No, pt to return to ED for any worsening symptoms. [address] follow up appointment(s): No future appointments. Interventions to address risk factors: Obtained and reviewed discharge summary and/or continuity of care documents and LPN Care Coordinator contact information provided. Advance Care Planning:   Does patient have an Advance Directive: No ACP documents. Educated patient about risk for severe COVID-19 due to risk factors according to CDC guidelines. LPN CC reviewed discharge instructions, medical action plan and red flag symptoms with the patient who verbalized understanding. Discussed COVID vaccination status: pt states she is not vaccinated, but will check into it when she is feeling better. Education provided on COVID-19 vaccination as appropriate.  Discussed exposure protocols and quarantine with CDC Guidelines. Patient was given an opportunity to verbalize any questions and concerns and agrees to contact LPN CC or health care provider for questions related to their healthcare. Reviewed and educated patient on any new and changed medications related to discharge diagnosis states she has already filled prescriptions and taking medications as ordered. Was patient discharged with a pulse oximeter? no Discussed discharge instructions and when to notify provider or seek emergency care. LPN CC provided contact information. will call within 14 days based on severity of symptoms and risk factors.

## 2022-07-01 ENCOUNTER — PATIENT OUTREACH (OUTPATIENT)
Dept: CASE MANAGEMENT | Age: 27
End: 2022-07-01

## 2022-07-01 NOTE — PROGRESS NOTES
Date/Time:  7/1/2022 11:46 AM   Call within 2 business days of discharge: Yes   Attempted to reach patient by telephone. Left HIPPA compliant message requesting a return call. Will attempt to reach patient again. Patient resolved from [address] Transitions episode on 7/1/22 s/p [facility] ED visit on 6/17/22 for positive COVID 19. Discussed COVID-19 related testing which was available at this time. Test results were positive. Patient informed of results, if available? yes     Patient/family has been provided the following resources and education related to COVID-19:                         Signs, symptoms and red flags related to COVID-19            CDC exposure and quarantine guidelines            [employer] exposure contact - [phone]            Contact for their local Department of Health                 Patient currently reports that the following symptoms have improved:  states feeling much better and back to normal routine. .    No further outreach scheduled with this CTN/ACM/LPN/HC/ MA. Episode of Care resolved. Patient has this CTN/ACM/LPN/HC/MA contact information if future needs arise.
n/a

## 2022-07-14 ENCOUNTER — HOSPITAL ENCOUNTER (EMERGENCY)
Age: 27
Discharge: HOME | End: 2022-07-14
Attending: STUDENT IN AN ORGANIZED HEALTH CARE EDUCATION/TRAINING PROGRAM
Payer: MEDICAID

## 2022-07-14 VITALS
TEMPERATURE: 98 F | SYSTOLIC BLOOD PRESSURE: 135 MMHG | RESPIRATION RATE: 16 BRPM | HEART RATE: 66 BPM | BODY MASS INDEX: 30.18 KG/M2 | DIASTOLIC BLOOD PRESSURE: 111 MMHG | OXYGEN SATURATION: 99 % | WEIGHT: 165 LBS

## 2022-07-14 DIAGNOSIS — F10.120 HANGOVER WITHOUT COMPLICATION (HCC): Primary | ICD-10-CM

## 2022-07-14 LAB
ALBUMIN SERPL-MCNC: 4.1 G/DL (ref 3.4–5)
ALBUMIN/GLOB SERPL: 1.1 {RATIO} (ref 0.8–1.7)
ALP SERPL-CCNC: 76 U/L (ref 45–117)
ALT SERPL-CCNC: 17 U/L (ref 13–56)
ANION GAP SERPL CALC-SCNC: 7 MMOL/L (ref 3–18)
AST SERPL-CCNC: 14 U/L (ref 10–38)
BASOPHILS # BLD: 0 K/UL (ref 0–0.1)
BASOPHILS NFR BLD: 1 % (ref 0–2)
BILIRUB SERPL-MCNC: 0.4 MG/DL (ref 0.2–1)
BUN SERPL-MCNC: 10 MG/DL (ref 7–18)
BUN/CREAT SERPL: 10 (ref 12–20)
CALCIUM SERPL-MCNC: 9.5 MG/DL (ref 8.5–10.1)
CHLORIDE SERPL-SCNC: 110 MMOL/L (ref 100–111)
CO2 SERPL-SCNC: 25 MMOL/L (ref 21–32)
CREAT SERPL-MCNC: 0.99 MG/DL (ref 0.6–1.3)
DIFFERENTIAL METHOD BLD: ABNORMAL
EOSINOPHIL # BLD: 0.1 K/UL (ref 0–0.4)
EOSINOPHIL NFR BLD: 1 % (ref 0–5)
ERYTHROCYTE [DISTWIDTH] IN BLOOD BY AUTOMATED COUNT: 12 % (ref 11.6–14.5)
GLOBULIN SER CALC-MCNC: 3.7 G/DL (ref 2–4)
GLUCOSE SERPL-MCNC: 99 MG/DL (ref 74–99)
HCG SERPL QL: NEGATIVE
HCT VFR BLD AUTO: 44.4 % (ref 35–45)
HGB BLD-MCNC: 14.8 G/DL (ref 12–16)
IMM GRANULOCYTES # BLD AUTO: 0 K/UL (ref 0–0.04)
IMM GRANULOCYTES NFR BLD AUTO: 0 % (ref 0–0.5)
LIPASE SERPL-CCNC: 200 U/L (ref 73–393)
LYMPHOCYTES # BLD: 2 K/UL (ref 0.9–3.6)
LYMPHOCYTES NFR BLD: 45 % (ref 21–52)
MCH RBC QN AUTO: 29.4 PG (ref 24–34)
MCHC RBC AUTO-ENTMCNC: 33.3 G/DL (ref 31–37)
MCV RBC AUTO: 88.1 FL (ref 78–100)
MONOCYTES # BLD: 0.4 K/UL (ref 0.05–1.2)
MONOCYTES NFR BLD: 10 % (ref 3–10)
NEUTS SEG # BLD: 1.9 K/UL (ref 1.8–8)
NEUTS SEG NFR BLD: 43 % (ref 40–73)
NRBC # BLD: 0 K/UL (ref 0–0.01)
NRBC BLD-RTO: 0 PER 100 WBC
PLATELET # BLD AUTO: 244 K/UL (ref 135–420)
PMV BLD AUTO: 11 FL (ref 9.2–11.8)
POTASSIUM SERPL-SCNC: 3.8 MMOL/L (ref 3.5–5.5)
PROT SERPL-MCNC: 7.8 G/DL (ref 6.4–8.2)
RBC # BLD AUTO: 5.04 M/UL (ref 4.2–5.3)
SODIUM SERPL-SCNC: 142 MMOL/L (ref 136–145)
WBC # BLD AUTO: 4.3 K/UL (ref 4.6–13.2)

## 2022-07-14 PROCEDURE — 80053 COMPREHEN METABOLIC PANEL: CPT

## 2022-07-14 PROCEDURE — 96374 THER/PROPH/DIAG INJ IV PUSH: CPT

## 2022-07-14 PROCEDURE — 84703 CHORIONIC GONADOTROPIN ASSAY: CPT

## 2022-07-14 PROCEDURE — 99284 EMERGENCY DEPT VISIT MOD MDM: CPT

## 2022-07-14 PROCEDURE — 96361 HYDRATE IV INFUSION ADD-ON: CPT

## 2022-07-14 PROCEDURE — 74011250636 HC RX REV CODE- 250/636: Performed by: STUDENT IN AN ORGANIZED HEALTH CARE EDUCATION/TRAINING PROGRAM

## 2022-07-14 PROCEDURE — 83690 ASSAY OF LIPASE: CPT

## 2022-07-14 PROCEDURE — 85025 COMPLETE CBC W/AUTO DIFF WBC: CPT

## 2022-07-14 RX ORDER — ONDANSETRON 4 MG/1
4 TABLET, ORALLY DISINTEGRATING ORAL
Qty: 20 TABLET | Refills: 0 | OUTPATIENT
Start: 2022-07-14 | End: 2022-09-07

## 2022-07-14 RX ORDER — ONDANSETRON 2 MG/ML
4 INJECTION INTRAMUSCULAR; INTRAVENOUS ONCE
Status: COMPLETED | OUTPATIENT
Start: 2022-07-14 | End: 2022-07-14

## 2022-07-14 RX ADMIN — SODIUM CHLORIDE 1000 ML: 900 INJECTION, SOLUTION INTRAVENOUS at 07:08

## 2022-07-14 RX ADMIN — ONDANSETRON 4 MG: 2 INJECTION INTRAMUSCULAR; INTRAVENOUS at 07:07

## 2022-07-14 NOTE — DISCHARGE INSTRUCTIONS
Please return to the ER with any new or worsening symptoms or any other concerns. Please return to the Emergency Department if you develop a fever, chills, cannot eat or drink due to nausea or vomiting, or if any of your symptoms worsen. Also, It is extremely important that you follow-up with a primary care physician and if you do not have one currently use the contact information provided to obtain an appointment. If none was provided please call the number on the back of your insurance card to locate a Primary care doctor. Many offices have \"cancellation lists\" that you can ask to be placed on; should a patient with an earlier appointment cancel you will be notified to take their place. Please return to the Emergency Room immediately if your symptoms worsen. Please carefully read all discharge instructions    [email or web address]    What are GoodRx coupons? GoodRx coupons will help you pay less than the cash price for your prescription. Ilda Morale free to use and are accepted at virtually every [place] pharmacy. Your pharmacist will know how to enter the codes on the coupon to pull up the lowest discount available.

## 2022-07-14 NOTE — ED PROVIDER NOTES
EMERGENCY DEPARTMENT HISTORY AND PHYSICAL EXAM    6:50 AM    Date: 7/14/2022  Patient Name: Pat Harding    History of Presenting Illness     Chief Complaint   Patient presents with    Vomiting       History Provided By: Patient  Location/Duration/Severity/Modifying factors   HPI   Pat Harding is a 27 y.o. female with no past medical problems presenting for evaluation of nausea and vomiting. She says that last night she went out with some friends at [facility] and had a few margaritas. Around midnight she started to feel nauseated and has been vomiting since. She took a Phenergan at home but it has not helped her symptoms. She does not feel like she has a bad hangover, and that she is very sensitive to alcohol. Prior to going out last night she felt well, was not having any abdominal pain, nausea, vomiting, diarrhea, fevers or chills. No other alleviating or aggravating factors. Says that when this happens she needs to come to the ER for IV fluids and Zofran. PCP: None    Current Outpatient Medications   Medication Sig Dispense Refill    ondansetron (ZOFRAN ODT) 4 mg disintegrating tablet Take 1 Tablet by mouth every eight (8) hours as needed for Nausea or Vomiting. 20 Tablet 0    methocarbamoL (ROBAXIN) 500 mg tablet Take 1 Tablet by mouth three (3) times daily as needed for Muscle Spasm(s). 20 Tablet 0    ondansetron hcl (Zofran) 4 mg tablet Take 1 Tablet by mouth every eight (8) hours as needed for Nausea. 12 Tablet 0    ondansetron (ZOFRAN ODT) 8 mg disintegrating tablet Take 1 Tablet by mouth every eight (8) hours as needed for Nausea for up to 12 doses. 12 Tablet 0    levonorgestreL (Mirena) 20 mcg/24 hours (6 yrs) 52 mg IUD 1 Device by IntraUTERine route once. Past History     Past Medical History:  History reviewed. No pertinent past medical history. Past Surgical History:  History reviewed. No pertinent surgical history. Family History:  History reviewed.  No pertinent family history. Social History:  Social History     Tobacco Use    Smoking status: Current Every Day Smoker    Smokeless tobacco: Never Used   Substance Use Topics    Alcohol use: Yes    Drug use: No       Allergies: Allergies   Allergen Reactions    Flagyl [Metronidazole] Anaphylaxis    Reglan [Metoclopramide] Swelling       I reviewed and confirmed the above information with patient and updated as necessary. Review of Systems     Review of Systems   Constitutional: Negative for diaphoresis and fever. HENT: Negative for ear pain and sore throat. Eyes:        No acute change in vision   Respiratory: Negative for cough and shortness of breath. Cardiovascular: Negative for chest pain and leg swelling. Gastrointestinal: Positive for nausea and vomiting. Negative for abdominal pain. Genitourinary: Negative for dysuria. Musculoskeletal: Negative for neck pain. Skin: Negative for wound. Neurological: Negative for weakness and headaches. Physical Exam     Visit Vitals  BP (!) 135/111 (BP 1 Location: Left upper arm, BP Patient Position: At rest)   Pulse 66   Temp 98 °F (36.7 °C)   Resp 16   Wt 74.8 kg (165 lb)   LMP 07/01/2022 (Approximate)   SpO2 99%   BMI 30.18 kg/m²       Physical Exam  Vitals and nursing note reviewed. Constitutional:       Comments: Adult female vomiting into an emesis bag, overall nontoxic   HENT:      Mouth/Throat:      Mouth: Mucous membranes are moist.   Eyes:      Pupils: Pupils are equal, round, and reactive to light. Cardiovascular:      Rate and Rhythm: Normal rate and regular rhythm. Pulses: Normal pulses. Pulmonary:      Effort: Pulmonary effort is normal.      Breath sounds: Normal breath sounds. Abdominal:      Palpations: Abdomen is soft. Tenderness: There is no abdominal tenderness. There is no guarding or rebound. Musculoskeletal:         General: No signs of injury. Normal range of motion. Cervical back: Normal range of motion. Skin:     General: Skin is warm. Neurological:      General: No focal deficit present. Mental Status: She is alert and oriented to person, place, and time. Mental status is at baseline. Diagnostic Study Results     Labs -  Recent Results (from the past 24 hour(s))   CBC WITH AUTOMATED DIFF    Collection Time: 07/14/22  7:05 AM   Result Value Ref Range    WBC 4.3 (L) 4.6 - 13.2 K/uL    RBC 5.04 4.20 - 5.30 M/uL    HGB 14.8 12.0 - 16.0 g/dL    HCT 44.4 35.0 - 45.0 %    MCV 88.1 78.0 - 100.0 FL    MCH 29.4 24.0 - 34.0 PG    MCHC 33.3 31.0 - 37.0 g/dL    RDW 12.0 11.6 - 14.5 %    PLATELET 886 845 - 116 K/uL    MPV 11.0 9.2 - 11.8 FL    NRBC 0.0 0  WBC    ABSOLUTE NRBC 0.00 0.00 - 0.01 K/uL    NEUTROPHILS 43 40 - 73 %    LYMPHOCYTES 45 21 - 52 %    MONOCYTES 10 3 - 10 %    EOSINOPHILS 1 0 - 5 %    BASOPHILS 1 0 - 2 %    IMMATURE GRANULOCYTES 0 0.0 - 0.5 %    ABS. NEUTROPHILS 1.9 1.8 - 8.0 K/UL    ABS. LYMPHOCYTES 2.0 0.9 - 3.6 K/UL    ABS. MONOCYTES 0.4 0.05 - 1.2 K/UL    ABS. EOSINOPHILS 0.1 0.0 - 0.4 K/UL    ABS. BASOPHILS 0.0 0.0 - 0.1 K/UL    ABS. IMM. GRANS. 0.0 0.00 - 0.04 K/UL    DF AUTOMATED     METABOLIC PANEL, COMPREHENSIVE    Collection Time: 07/14/22  7:05 AM   Result Value Ref Range    Sodium 142 136 - 145 mmol/L    Potassium 3.8 3.5 - 5.5 mmol/L    Chloride 110 100 - 111 mmol/L    CO2 25 21 - 32 mmol/L    Anion gap 7 3.0 - 18 mmol/L    Glucose 99 74 - 99 mg/dL    BUN 10 7.0 - 18 MG/DL    Creatinine 0.99 0.6 - 1.3 MG/DL    BUN/Creatinine ratio 10 (L) 12 - 20      GFR est AA >60 >60 ml/min/1.73m2    GFR est non-AA >60 >60 ml/min/1.73m2    Calcium 9.5 8.5 - 10.1 MG/DL    Bilirubin, total 0.4 0.2 - 1.0 MG/DL    ALT (SGPT) 17 13 - 56 U/L    AST (SGOT) 14 10 - 38 U/L    Alk.  phosphatase 76 45 - 117 U/L    Protein, total 7.8 6.4 - 8.2 g/dL    Albumin 4.1 3.4 - 5.0 g/dL    Globulin 3.7 2.0 - 4.0 g/dL    A-G Ratio 1.1 0.8 - 1.7     LIPASE    Collection Time: 07/14/22  7:05 AM   Result Value Ref Range    Lipase 200 73 - 393 U/L   HCG QL SERUM    Collection Time: 07/14/22  7:05 AM   Result Value Ref Range    HCG, Ql. Negative NEG           Radiologic Studies -   No orders to display           Medical Decision Making   I am the first provider for this patient. I reviewed the vital signs, available nursing notes, past medical history, past surgical history, family history and social history. Vital Signs-Reviewed the patient's vital signs. Records Reviewed: Nursing Notes and Old Medical Records (Time of Review: 6:50 AM)    Provider Notes (Medical Decision Making):   MDM  81-year-old female here for nausea and vomiting, likely due to hangover from alcohol consider also acute tox Acacian, metabolic derangement, pregnancy, others    ED Course: Progress Notes, Reevaluation, and Consults:  Patient afebrile and hemodynamically normal  Nontoxic appearing but actively vomiting    Will treat with IV fluids and Zofran, screen labs and pregnancy. Screening labs are all reassuring, patient tolerating p.o. here in the emergency department, has a smile on her face and would like to be discharged home. Counseled on the importance of limiting her alcohol intake. She is discharged home in stable condition, prescription for Zofran was filled. Procedures    Diagnosis     Clinical Impression:   1. Hangover without complication ([address]        Disposition: Home    Follow-up Information     Follow up With Specialties Details Why [address] [facility] EMERGENCY DEPT Emergency Medicine  As needed, If symptoms worsen [address] [place] [id number]  [phone]           Patient's Medications   Start Taking    ONDANSETRON (ZOFRAN ODT) 4 MG DISINTEGRATING TABLET    Take 1 Tablet by mouth every eight (8) hours as needed for Nausea or Vomiting. Continue Taking    LEVONORGESTREL (MIRENA) 20 MCG/24 HOURS (6 YRS) 52 MG IUD    1 Device by IntraUTERine route once.     METHOCARBAMOL (ROBAXIN) 500 MG TABLET Take 1 Tablet by mouth three (3) times daily as needed for Muscle Spasm(s). ONDANSETRON (ZOFRAN ODT) 8 MG DISINTEGRATING TABLET    Take 1 Tablet by mouth every eight (8) hours as needed for Nausea for up to 12 doses. ONDANSETRON HCL (ZOFRAN) 4 MG TABLET    Take 1 Tablet by mouth every eight (8) hours as needed for Nausea. These Medications have changed    No medications on file   Stop Taking    No medications on file       Tata Christian MD   Emergency Medicine   July 14, 2022, 6:50 AM     This note is dictated utilizing Dragon voice recognition software. Unfortunately this leads to occasional typographical errors using the voice recognition. I apologize in advance if the situation occurs. If questions occur please do not hesitate to contact me directly.     Audrey Post MD

## 2022-07-14 NOTE — ED TRIAGE NOTES
Pt c/o \"bad hangover\"  States she has been vomiting for the last 9 hours after drinking casa migos

## 2022-07-20 ENCOUNTER — HOSPITAL ENCOUNTER (EMERGENCY)
Age: 27
Discharge: HOME | End: 2022-07-20
Attending: STUDENT IN AN ORGANIZED HEALTH CARE EDUCATION/TRAINING PROGRAM
Payer: MEDICAID

## 2022-07-20 VITALS
SYSTOLIC BLOOD PRESSURE: 131 MMHG | TEMPERATURE: 98.7 F | DIASTOLIC BLOOD PRESSURE: 91 MMHG | BODY MASS INDEX: 29.81 KG/M2 | HEIGHT: 62 IN | HEART RATE: 58 BPM | WEIGHT: 162 LBS | RESPIRATION RATE: 20 BRPM | OXYGEN SATURATION: 98 %

## 2022-07-20 DIAGNOSIS — R11.2 NAUSEA AND VOMITING, UNSPECIFIED VOMITING TYPE: Primary | ICD-10-CM

## 2022-07-20 PROCEDURE — 74011250636 HC RX REV CODE- 250/636: Performed by: PHYSICIAN ASSISTANT

## 2022-07-20 PROCEDURE — 96361 HYDRATE IV INFUSION ADD-ON: CPT

## 2022-07-20 PROCEDURE — 96360 HYDRATION IV INFUSION INIT: CPT

## 2022-07-20 PROCEDURE — 96374 THER/PROPH/DIAG INJ IV PUSH: CPT

## 2022-07-20 PROCEDURE — 96375 TX/PRO/DX INJ NEW DRUG ADDON: CPT

## 2022-07-20 PROCEDURE — 99284 EMERGENCY DEPT VISIT MOD MDM: CPT

## 2022-07-20 RX ORDER — KETOROLAC TROMETHAMINE 15 MG/ML
15 INJECTION, SOLUTION INTRAMUSCULAR; INTRAVENOUS
Status: COMPLETED | OUTPATIENT
Start: 2022-07-20 | End: 2022-07-20

## 2022-07-20 RX ORDER — ONDANSETRON 2 MG/ML
4 INJECTION INTRAMUSCULAR; INTRAVENOUS
Status: COMPLETED | OUTPATIENT
Start: 2022-07-20 | End: 2022-07-20

## 2022-07-20 RX ORDER — PROMETHAZINE HYDROCHLORIDE 25 MG/1
25 SUPPOSITORY RECTAL
Qty: 12 SUPPOSITORY | Refills: 0 | Status: SHIPPED | OUTPATIENT
Start: 2022-07-20 | End: 2022-07-27

## 2022-07-20 RX ADMIN — SODIUM CHLORIDE 1000 ML: 900 INJECTION, SOLUTION INTRAVENOUS at 16:06

## 2022-07-20 RX ADMIN — ONDANSETRON 4 MG: 2 INJECTION INTRAMUSCULAR; INTRAVENOUS at 16:30

## 2022-07-20 RX ADMIN — KETOROLAC TROMETHAMINE 15 MG: 15 INJECTION, SOLUTION INTRAMUSCULAR; INTRAVENOUS at 16:30

## 2022-07-20 NOTE — Clinical Note
[facility] EMERGENCY DEPT  [address] [id number] [phone]    Work/School Note    Date: 7/20/2022    To Whom It May concern:    Carlos Parra was seen and treated today in the emergency room by the following provider(s):  Attending Provider: Maryanne Butler MD  Physician Assistant: JOSELO Montano. Carlos Parra is excused from work/school on 07/20/22 and 07/21/22. She is medically clear to return to work/school on 7/22/2022.        Sincerely,          JOSELO García

## 2022-07-20 NOTE — ED PROVIDER NOTES
EMERGENCY DEPARTMENT HISTORY AND PHYSICAL EXAM    Date: 7/20/2022  Patient Name: Sung Hermosillo    History of Presenting Illness     Chief Complaint   Patient presents with    Nausea         History Provided By: Patient    Chief Complaint: Nausea and vomiting  Duration: Couple days  Timing: Intermittent  Location: Abdomen  Quality: Nauseous  Severity: Moderate  Modifying Factors: Worse after starting metoprolol  Associated Symptoms: none       Additional History (Context): Sung Hermosillo is a 27 y.o. female with a history of hypertension who presents today for issues listed above. Patient reports she was recently started on metoprolol and since has been having intermittent episodes of nausea and vomiting. Patient reports that when she starts vomiting that is \"it\". States that she is unable to stop or keep anything down. Denies any marijuana abuse or concern for pregnancy. Reports she has tried Zofran without help. States she has been seen in the emergency department for this multiple times. PCP: None    Current Outpatient Medications   Medication Sig Dispense Refill    promethazine (PHENERGAN) 25 mg suppository Insert 1 Suppository into rectum every six (6) hours as needed for Nausea for up to 7 days. 12 Suppository 0    ondansetron (ZOFRAN ODT) 4 mg disintegrating tablet Take 1 Tablet by mouth every eight (8) hours as needed for Nausea or Vomiting. 20 Tablet 0    methocarbamoL (ROBAXIN) 500 mg tablet Take 1 Tablet by mouth three (3) times daily as needed for Muscle Spasm(s). 20 Tablet 0    ondansetron hcl (Zofran) 4 mg tablet Take 1 Tablet by mouth every eight (8) hours as needed for Nausea. 12 Tablet 0    ondansetron (ZOFRAN ODT) 8 mg disintegrating tablet Take 1 Tablet by mouth every eight (8) hours as needed for Nausea for up to 12 doses. 12 Tablet 0    levonorgestreL (Mirena) 20 mcg/24 hours (6 yrs) 52 mg IUD 1 Device by IntraUTERine route once.          Past History     Past Medical History:  No past medical history on file. Past Surgical History:  No past surgical history on file. Family History:  No family history on file. Social History:  Social History     Tobacco Use    Smoking status: Every Day    Smokeless tobacco: Never   Substance Use Topics    Alcohol use: Yes    Drug use: No       Allergies: Allergies   Allergen Reactions    Flagyl [Metronidazole] Anaphylaxis    Reglan [Metoclopramide] Swelling         Review of Systems   Review of Systems   Constitutional:  Negative for chills and fever. HENT:  Negative for congestion, rhinorrhea and sore throat. Respiratory:  Negative for cough and shortness of breath. Cardiovascular:  Negative for chest pain. Gastrointestinal:  Positive for nausea and vomiting. Negative for abdominal pain, blood in stool, constipation and diarrhea. Genitourinary:  Negative for dysuria, frequency and hematuria. Musculoskeletal:  Negative for back pain and myalgias. Skin:  Negative for rash and wound. Neurological:  Negative for dizziness and headaches. All other systems reviewed and are negative. All Other Systems Negative  Physical Exam     Vitals:    07/20/22 1549   BP: (!) 131/91   Pulse: (!) 58   Resp: 20   Temp: 98.7 °F (37.1 °C)   SpO2: 98%   Weight: 73.5 kg (162 lb)   Height: 5' 2\" (1.575 m)     Physical Exam  Vitals and nursing note reviewed. Constitutional:       General: She is not in acute distress. Appearance: She is well-developed. She is not diaphoretic. Comments: Well-appearing, no distress   HENT:      Head: Normocephalic and atraumatic. Eyes:      Conjunctiva/sclera: Conjunctivae normal.   Cardiovascular:      Rate and Rhythm: Normal rate and regular rhythm. Heart sounds: Normal heart sounds. Pulmonary:      Effort: Pulmonary effort is normal. No respiratory distress. Breath sounds: Normal breath sounds. Chest:      Chest wall: No tenderness.    Abdominal:      General: Bowel sounds are normal. There is no distension. Palpations: Abdomen is soft. Tenderness: There is no abdominal tenderness. There is no guarding or rebound. Musculoskeletal:         General: No deformity. Cervical back: Normal range of motion and neck supple. Skin:     General: Skin is warm and dry. Neurological:      Mental Status: She is alert and oriented to person, place, and time. Deep Tendon Reflexes: Reflexes are normal and symmetric. Diagnostic Study Results     Labs -   No results found for this or any previous visit (from the past 12 hour(s)). Radiologic Studies -   No orders to display     CT Results  (Last 48 hours)      None          CXR Results  (Last 48 hours)      None              Medical Decision Making   I am the first provider for this patient. I reviewed the vital signs, available nursing notes, past medical history, past surgical history, family history and social history. Vital Signs-Reviewed the patient's vital signs. Records Reviewed: Nursing Notes and Old Medical Records     Procedures: None   Procedures    Provider Notes (Medical Decision Making):     Differential: Medication tolerance, substance abuse, electrolyte normality, pregnancy    Plan: We will order dose of Zofran and Toradol. Will order fluids per patient request.  Do not feel emergent work-up is needed at this time. 5:38 PM  Patient reports that she is feeling much better. Will discharge home with trial of Phenergan suppositories for her next episode of nausea and vomiting. Did discuss with patient that she will need to follow-up closely with her primary care doctor to reevaluate the medication she is on for her blood pressure. She agrees. Will discharge home. MED RECONCILIATION:  No current facility-administered medications for this encounter.      Current Outpatient Medications   Medication Sig    promethazine (PHENERGAN) 25 mg suppository Insert 1 Suppository into rectum every six (6) hours as needed for Nausea for up to 7 days. ondansetron (ZOFRAN ODT) 4 mg disintegrating tablet Take 1 Tablet by mouth every eight (8) hours as needed for Nausea or Vomiting. methocarbamoL (ROBAXIN) 500 mg tablet Take 1 Tablet by mouth three (3) times daily as needed for Muscle Spasm(s). ondansetron hcl (Zofran) 4 mg tablet Take 1 Tablet by mouth every eight (8) hours as needed for Nausea. ondansetron (ZOFRAN ODT) 8 mg disintegrating tablet Take 1 Tablet by mouth every eight (8) hours as needed for Nausea for up to 12 doses. levonorgestreL (Mirena) 20 mcg/24 hours (6 yrs) 52 mg IUD 1 Device by IntraUTERine route once. Disposition:  Home     DISCHARGE NOTE:   Pt has been reexamined. Patient has no new complaints, changes, or physical findings. Care plan outlined and precautions discussed. Results of workup were reviewed with the patient. All medications were reviewed with the patient. All of pt's questions and concerns were addressed. Patient was instructed and agrees to follow up with PCP as well as to return to the ED upon further deterioration. Patient is ready to go home. Follow-up Information       Follow up With Specialties Details Why Contact Info    [facility] EMERGENCY DEPT Emergency Medicine  As needed [address] [place] [id number] [phone]    Follow-up in 1 to 2 days with your primary care doctor                Current Discharge Medication List        START taking these medications    Details   promethazine (PHENERGAN) 25 mg suppository Insert 1 Suppository into rectum every six (6) hours as needed for Nausea for up to 7 days. Qty: 12 Suppository, Refills: 0  Start date: 7/20/2022, End date: 7/27/2022                 Diagnosis     Clinical Impression:   1. Nausea and vomiting, unspecified vomiting type          \"Please note that this dictation was completed with [employer] voice recognition software.  Quite often unanticipated grammatical, syntax, homophones, and other interpretive errors are inadvertently transcribed by the computer software. Please disregard these errors. Please excuse any errors that have escaped final proofreading. \"

## 2022-07-20 NOTE — Clinical Note
[address] [place] [address] Dr [facility] EMERGENCY DEPT  [id number] [facility] [id number] [phone]    Work/School Note    Date: 7/20/2022    To Whom It May concern:    Grisel Beauchamp was seen and treated today in the emergency room by the following provider(s):  Attending Provider: Ching Thomas MD  Physician Assistant: JOSELO Turcios. Grisel Beauchamp is excused from work/school on 07/20/22 and 07/21/22. She is medically clear to return to work/school on 7/22/2022.        Sincerely,          JOSELO Tubbs

## 2022-09-07 ENCOUNTER — HOSPITAL ENCOUNTER (EMERGENCY)
Age: 27
Discharge: HOME | End: 2022-09-07
Attending: EMERGENCY MEDICINE
Payer: MEDICAID

## 2022-09-07 VITALS
HEIGHT: 62 IN | DIASTOLIC BLOOD PRESSURE: 98 MMHG | HEART RATE: 87 BPM | TEMPERATURE: 97.9 F | SYSTOLIC BLOOD PRESSURE: 144 MMHG | OXYGEN SATURATION: 100 % | WEIGHT: 165 LBS | BODY MASS INDEX: 30.36 KG/M2 | RESPIRATION RATE: 18 BRPM

## 2022-09-07 DIAGNOSIS — R11.2 NAUSEA AND VOMITING, UNSPECIFIED VOMITING TYPE: ICD-10-CM

## 2022-09-07 DIAGNOSIS — K02.9 DENTAL CARIES, UNSPECIFIED: ICD-10-CM

## 2022-09-07 DIAGNOSIS — K08.89 TOOTHACHE: Primary | ICD-10-CM

## 2022-09-07 LAB
ANION GAP SERPL CALC-SCNC: 7 MMOL/L (ref 3–18)
BASOPHILS # BLD: 0 K/UL (ref 0–0.1)
BASOPHILS NFR BLD: 0 % (ref 0–2)
BUN SERPL-MCNC: 7 MG/DL (ref 7–18)
BUN/CREAT SERPL: 7 (ref 12–20)
CALCIUM SERPL-MCNC: 9.4 MG/DL (ref 8.5–10.1)
CHLORIDE SERPL-SCNC: 108 MMOL/L (ref 100–111)
CO2 SERPL-SCNC: 26 MMOL/L (ref 21–32)
CREAT SERPL-MCNC: 0.99 MG/DL (ref 0.6–1.3)
DIFFERENTIAL METHOD BLD: ABNORMAL
EOSINOPHIL # BLD: 0 K/UL (ref 0–0.4)
EOSINOPHIL NFR BLD: 0 % (ref 0–5)
ERYTHROCYTE [DISTWIDTH] IN BLOOD BY AUTOMATED COUNT: 12.3 % (ref 11.6–14.5)
GLUCOSE SERPL-MCNC: 102 MG/DL (ref 74–99)
HCT VFR BLD AUTO: 45.1 % (ref 35–45)
HGB BLD-MCNC: 15.1 G/DL (ref 12–16)
IMM GRANULOCYTES # BLD AUTO: 0.1 K/UL (ref 0–0.04)
IMM GRANULOCYTES NFR BLD AUTO: 0 % (ref 0–0.5)
LYMPHOCYTES # BLD: 0.6 K/UL (ref 0.9–3.6)
LYMPHOCYTES NFR BLD: 4 % (ref 21–52)
MCH RBC QN AUTO: 29.4 PG (ref 24–34)
MCHC RBC AUTO-ENTMCNC: 33.5 G/DL (ref 31–37)
MCV RBC AUTO: 87.7 FL (ref 78–100)
MONOCYTES # BLD: 1 K/UL (ref 0.05–1.2)
MONOCYTES NFR BLD: 7 % (ref 3–10)
NEUTS SEG # BLD: 13.3 K/UL (ref 1.8–8)
NEUTS SEG NFR BLD: 89 % (ref 40–73)
NRBC # BLD: 0 K/UL (ref 0–0.01)
NRBC BLD-RTO: 0 PER 100 WBC
PLATELET # BLD AUTO: 197 K/UL (ref 135–420)
PMV BLD AUTO: 10.9 FL (ref 9.2–11.8)
POTASSIUM SERPL-SCNC: 3.7 MMOL/L (ref 3.5–5.5)
RBC # BLD AUTO: 5.14 M/UL (ref 4.2–5.3)
SODIUM SERPL-SCNC: 141 MMOL/L (ref 136–145)
WBC # BLD AUTO: 14.9 K/UL (ref 4.6–13.2)

## 2022-09-07 PROCEDURE — 74011250636 HC RX REV CODE- 250/636: Performed by: PHYSICIAN ASSISTANT

## 2022-09-07 PROCEDURE — 74011250637 HC RX REV CODE- 250/637: Performed by: PHYSICIAN ASSISTANT

## 2022-09-07 PROCEDURE — 99284 EMERGENCY DEPT VISIT MOD MDM: CPT

## 2022-09-07 PROCEDURE — 96374 THER/PROPH/DIAG INJ IV PUSH: CPT

## 2022-09-07 PROCEDURE — 85025 COMPLETE CBC W/AUTO DIFF WBC: CPT

## 2022-09-07 PROCEDURE — 80048 BASIC METABOLIC PNL TOTAL CA: CPT

## 2022-09-07 RX ORDER — PROMETHAZINE HYDROCHLORIDE 25 MG/1
25 SUPPOSITORY RECTAL
Status: COMPLETED | OUTPATIENT
Start: 2022-09-07 | End: 2022-09-07

## 2022-09-07 RX ORDER — KETOROLAC TROMETHAMINE 15 MG/ML
15 INJECTION, SOLUTION INTRAMUSCULAR; INTRAVENOUS
Status: COMPLETED | OUTPATIENT
Start: 2022-09-07 | End: 2022-09-07

## 2022-09-07 RX ORDER — PROMETHAZINE HYDROCHLORIDE 25 MG/1
25 TABLET ORAL
Qty: 12 TABLET | Refills: 0 | Status: SHIPPED | OUTPATIENT
Start: 2022-09-07

## 2022-09-07 RX ORDER — AMOXICILLIN 875 MG/1
875 TABLET, FILM COATED ORAL 2 TIMES DAILY
Qty: 20 TABLET | Refills: 0 | Status: SHIPPED | OUTPATIENT
Start: 2022-09-07 | End: 2022-09-17

## 2022-09-07 RX ORDER — FLUCONAZOLE 150 MG/1
150 TABLET ORAL
Qty: 1 TABLET | Refills: 0 | Status: SHIPPED | OUTPATIENT
Start: 2022-09-07 | End: 2022-09-07

## 2022-09-07 RX ORDER — PROCHLORPERAZINE EDISYLATE 5 MG/ML
10 INJECTION INTRAMUSCULAR; INTRAVENOUS
Status: DISCONTINUED | OUTPATIENT
Start: 2022-09-07 | End: 2022-09-07

## 2022-09-07 RX ORDER — IBUPROFEN 800 MG/1
800 TABLET ORAL
Qty: 20 TABLET | Refills: 0 | Status: SHIPPED | OUTPATIENT
Start: 2022-09-07 | End: 2022-09-14

## 2022-09-07 RX ADMIN — KETOROLAC TROMETHAMINE 15 MG: 15 INJECTION, SOLUTION INTRAMUSCULAR; INTRAVENOUS at 21:59

## 2022-09-07 RX ADMIN — PROMETHAZINE HYDROCHLORIDE 25 MG: 25 SUPPOSITORY RECTAL at 21:59

## 2022-09-08 ENCOUNTER — HOSPITAL ENCOUNTER (EMERGENCY)
Age: 27
Discharge: HOME | End: 2022-09-09
Attending: EMERGENCY MEDICINE
Payer: MEDICAID

## 2022-09-08 VITALS
RESPIRATION RATE: 20 BRPM | TEMPERATURE: 101.7 F | WEIGHT: 165 LBS | HEIGHT: 62 IN | HEART RATE: 101 BPM | BODY MASS INDEX: 30.36 KG/M2 | OXYGEN SATURATION: 100 %

## 2022-09-08 DIAGNOSIS — K04.7 DENTAL ABSCESS: Primary | ICD-10-CM

## 2022-09-08 DIAGNOSIS — K02.9 DENTAL CARIES: ICD-10-CM

## 2022-09-08 LAB
ANION GAP SERPL CALC-SCNC: 7 MMOL/L (ref 3–18)
BASOPHILS # BLD: 0 K/UL (ref 0–0.1)
BASOPHILS NFR BLD: 0 % (ref 0–2)
BUN SERPL-MCNC: 6 MG/DL (ref 7–18)
BUN/CREAT SERPL: 6 (ref 12–20)
CALCIUM SERPL-MCNC: 9.7 MG/DL (ref 8.5–10.1)
CHLORIDE SERPL-SCNC: 106 MMOL/L (ref 100–111)
CO2 SERPL-SCNC: 25 MMOL/L (ref 21–32)
CREAT SERPL-MCNC: 0.98 MG/DL (ref 0.6–1.3)
DIFFERENTIAL METHOD BLD: ABNORMAL
EOSINOPHIL # BLD: 0 K/UL (ref 0–0.4)
EOSINOPHIL NFR BLD: 0 % (ref 0–5)
ERYTHROCYTE [DISTWIDTH] IN BLOOD BY AUTOMATED COUNT: 12.2 % (ref 11.6–14.5)
GLUCOSE SERPL-MCNC: 86 MG/DL (ref 74–99)
HCT VFR BLD AUTO: 46 % (ref 35–45)
HGB BLD-MCNC: 15.3 G/DL (ref 12–16)
IMM GRANULOCYTES # BLD AUTO: 0 K/UL (ref 0–0.04)
IMM GRANULOCYTES NFR BLD AUTO: 0 % (ref 0–0.5)
LYMPHOCYTES # BLD: 0.7 K/UL (ref 0.9–3.6)
LYMPHOCYTES NFR BLD: 12 % (ref 21–52)
MCH RBC QN AUTO: 29.2 PG (ref 24–34)
MCHC RBC AUTO-ENTMCNC: 33.3 G/DL (ref 31–37)
MCV RBC AUTO: 87.8 FL (ref 78–100)
MONOCYTES # BLD: 0.5 K/UL (ref 0.05–1.2)
MONOCYTES NFR BLD: 9 % (ref 3–10)
NEUTS SEG # BLD: 4.2 K/UL (ref 1.8–8)
NEUTS SEG NFR BLD: 78 % (ref 40–73)
NRBC # BLD: 0 K/UL (ref 0–0.01)
NRBC BLD-RTO: 0 PER 100 WBC
PLATELET # BLD AUTO: 199 K/UL (ref 135–420)
PMV BLD AUTO: 10.6 FL (ref 9.2–11.8)
POTASSIUM SERPL-SCNC: 4 MMOL/L (ref 3.5–5.5)
RBC # BLD AUTO: 5.24 M/UL (ref 4.2–5.3)
SODIUM SERPL-SCNC: 138 MMOL/L (ref 136–145)
WBC # BLD AUTO: 5.4 K/UL (ref 4.6–13.2)

## 2022-09-08 PROCEDURE — 74011000250 HC RX REV CODE- 250: Performed by: EMERGENCY MEDICINE

## 2022-09-08 PROCEDURE — 80048 BASIC METABOLIC PNL TOTAL CA: CPT

## 2022-09-08 PROCEDURE — 85025 COMPLETE CBC W/AUTO DIFF WBC: CPT

## 2022-09-08 PROCEDURE — 96366 THER/PROPH/DIAG IV INF ADDON: CPT

## 2022-09-08 PROCEDURE — 96375 TX/PRO/DX INJ NEW DRUG ADDON: CPT

## 2022-09-08 PROCEDURE — 99284 EMERGENCY DEPT VISIT MOD MDM: CPT

## 2022-09-08 PROCEDURE — 96365 THER/PROPH/DIAG IV INF INIT: CPT

## 2022-09-08 PROCEDURE — 74011000258 HC RX REV CODE- 258: Performed by: EMERGENCY MEDICINE

## 2022-09-08 PROCEDURE — 74011250636 HC RX REV CODE- 250/636: Performed by: EMERGENCY MEDICINE

## 2022-09-08 RX ORDER — MORPHINE SULFATE 2 MG/ML
2 INJECTION, SOLUTION INTRAMUSCULAR; INTRAVENOUS
Status: COMPLETED | OUTPATIENT
Start: 2022-09-08 | End: 2022-09-08

## 2022-09-08 RX ORDER — PROCHLORPERAZINE EDISYLATE 5 MG/ML
5 INJECTION INTRAMUSCULAR; INTRAVENOUS ONCE
Status: DISCONTINUED | OUTPATIENT
Start: 2022-09-08 | End: 2022-09-09 | Stop reason: HOSPADM

## 2022-09-08 RX ORDER — OXYCODONE AND ACETAMINOPHEN 5; 325 MG/1; MG/1
1 TABLET ORAL
Qty: 12 TABLET | Refills: 0 | Status: SHIPPED | OUTPATIENT
Start: 2022-09-08 | End: 2022-09-12

## 2022-09-08 RX ORDER — AMOXICILLIN AND CLAVULANATE POTASSIUM 875; 125 MG/1; MG/1
1 TABLET, FILM COATED ORAL
Status: DISCONTINUED | OUTPATIENT
Start: 2022-09-08 | End: 2022-09-09 | Stop reason: HOSPADM

## 2022-09-08 RX ORDER — PROMETHAZINE HYDROCHLORIDE 25 MG/1
25 TABLET ORAL
Qty: 12 TABLET | Refills: 0 | Status: SHIPPED | OUTPATIENT
Start: 2022-09-08

## 2022-09-08 RX ORDER — KETOROLAC TROMETHAMINE 15 MG/ML
15 INJECTION, SOLUTION INTRAMUSCULAR; INTRAVENOUS
Status: COMPLETED | OUTPATIENT
Start: 2022-09-08 | End: 2022-09-08

## 2022-09-08 RX ORDER — AMOXICILLIN AND CLAVULANATE POTASSIUM 875; 125 MG/1; MG/1
1 TABLET, FILM COATED ORAL 2 TIMES DAILY
Qty: 20 TABLET | Refills: 0 | Status: SHIPPED | OUTPATIENT
Start: 2022-09-08 | End: 2022-09-18

## 2022-09-08 RX ORDER — LIDOCAINE HYDROCHLORIDE 20 MG/ML
5 SOLUTION ORAL; TOPICAL
Qty: 200 ML | Refills: 0 | Status: SHIPPED | OUTPATIENT
Start: 2022-09-08

## 2022-09-08 RX ORDER — LIDOCAINE HYDROCHLORIDE 20 MG/ML
15 SOLUTION OROPHARYNGEAL
Status: DISCONTINUED | OUTPATIENT
Start: 2022-09-08 | End: 2022-09-09 | Stop reason: HOSPADM

## 2022-09-08 RX ORDER — IBUPROFEN 400 MG/1
800 TABLET ORAL
Status: DISCONTINUED | OUTPATIENT
Start: 2022-09-08 | End: 2022-09-09 | Stop reason: HOSPADM

## 2022-09-08 RX ORDER — OXYCODONE AND ACETAMINOPHEN 5; 325 MG/1; MG/1
1 TABLET ORAL
Status: DISCONTINUED | OUTPATIENT
Start: 2022-09-08 | End: 2022-09-09 | Stop reason: HOSPADM

## 2022-09-08 RX ORDER — IBUPROFEN 800 MG/1
800 TABLET ORAL EVERY 8 HOURS
Qty: 15 TABLET | Refills: 0 | Status: SHIPPED | OUTPATIENT
Start: 2022-09-08 | End: 2022-09-13

## 2022-09-08 RX ADMIN — MORPHINE SULFATE 2 MG: 2 INJECTION, SOLUTION INTRAMUSCULAR; INTRAVENOUS at 22:37

## 2022-09-08 RX ADMIN — CLINDAMYCIN PHOSPHATE 900 MG: 150 INJECTION, SOLUTION INTRAVENOUS at 22:36

## 2022-09-08 RX ADMIN — KETOROLAC TROMETHAMINE 15 MG: 15 INJECTION, SOLUTION INTRAMUSCULAR; INTRAVENOUS at 22:36

## 2022-09-08 RX ADMIN — SODIUM CHLORIDE 1000 ML: 9 INJECTION, SOLUTION INTRAVENOUS at 22:36

## 2022-09-08 NOTE — DISCHARGE INSTRUCTIONS
SPECIFIC PATIENT INSTRUCTIONS FROM THE PROVIDER WHO TREATED YOU IN THE ER TODAY    Take antibiotics as directed. Finish an entire course! Increase intake of over-the-counter probiotics (probiotic supplements, yogurt) when taking antibiotics to prevent antibiotic associated diarrhea and yeast infection. Use secondary birth control when taking antibiotics (for females, if applicable)   Rinse your mouth with mouth wash after each meal or more often. Take Tylenol (every 4-6 hours) Motrin (every 6-8 hours) for pain as needed. Follow-up in 7-10 days with your dentist as planned/scheduled.

## 2022-09-08 NOTE — PROGRESS NOTES
Promethazine IVPB was therapeutically interchanged for Prochlorperazine per the P[employer] approved therapeutic interchange policy. IV promethazine is not longer on hospital formulary.

## 2022-09-08 NOTE — ED PROVIDER NOTES
[facility]  [facility] EMERGENCY DEPT      27 y.o. female with history of vomiting who presents to the emergency department ambulatory accompanied by mother with c/o left upper 2nd premolar pain secondary to damaged root. Patient was seen by her dentist today and was told that she needs a root canal, she had extensive x-rays in dental clinic. Patient did not receive any prescriptions from her dentist.  Notes, the pain is throbbing, took [facility] powder earlier today and now developed vomiting. Patient did not eat today. Patient notes that she feels hot when vomits but denies fever, chills, cold-like symptoms, diarrhea, abdominal pain, skin/eye tone changes. Patient has an appointment with her dentist next week. LMP now. Denies possibility of pregnancy due to inactivity lately. No other complaints. No past medical history on file. Allergies   Allergen Reactions    Flagyl [Metronidazole] Anaphylaxis    Reglan [Metoclopramide] Swelling        Social History     Socioeconomic History    Marital status: SINGLE     Spouse name: Not on file    Number of children: Not on file    Years of education: Not on file    Highest education level: Not on file   Occupational History    Not on file   Tobacco Use    Smoking status: Every Day    Smokeless tobacco: Never   Substance and Sexual Activity    Alcohol use: Yes    Drug use: No    Sexual activity: Not on file   Other Topics Concern    Not on file   Social History Narrative    Not on file     Social Determinants of Health     Financial Resource Strain: Not on file   Food Insecurity: Not on file   Transportation Needs: Not on file   Physical Activity: Not on file   Stress: Not on file   Social Connections: Not on file   Intimate Partner Violence: Not on file   Housing Stability: Not on file        REVIEW OF SYSTEMS:    All other systems negative    Constitutional:  Negative for fever, chills, diaphoresis.   HENT: + toothache Negative for congestion, sinus pain, ear pain, sore throat, drooling, inability to control secretions, difficulty to open the mouth, difficulty swallowing. Respiratory:  Negative for cough and shortness of breath. Cardiovascular:  Negative for chest pain and palpitations. Gastrointestinal:  +nausea, vomiting Negative for abdominal pain, diarrhea. Skin:  Negative. Neurological:  Negative. Visit Vitals  BP (!) 144/98 (BP 1 Location: Left upper arm, BP Patient Position: At rest)   Pulse 87   Temp 97.9 °F (36.6 °C)   Resp 18   Ht 5' 2\" (1.575 m)   Wt 74.8 kg (165 lb)   SpO2 100%   BMI 30.18 kg/m²       PHYSICAL EXAM:    Nursing notes and vital signs reviewed. CONSTITUTIONAL:  Alert, in no apparent distress;  well developed;  well nourished. HEAD:  Normocephalic, atraumatic, no facial swelling, no head lymphadenopathy. EYES:  EOMI. Non-icteric sclera. Normal conjunctiva. ENTM:  Nose:  no rhinorrhea, sinus pain or nasal phonation. Throat:  no erythema or exudate, mucous membranes moist and without lesions, uvula midline, patent airway. Ears: TM's intact bilaterally    Oropharynx/ teeth:     Dental pain at # 13, the crown appears intact with silver filling. There IS evidence of dental decay. There IS tenderness on palpation and percussion. The airway IS patent. NO adjacent mucobuccal soft tissue swelling, fluctuance or gingival bleeding. NO pus/drainage. NO swelling or elevation of the tongue. NO sublingual swelling or TTP. NO facial swelling. NO neck swelling. RESPIRATORY:  Chest clear, equal breath sounds, good air movement. CARDIOVASCULAR:  Regular rate and rhythm. No murmurs, rubs, or gallops. NEURO:  Moves all four extremities, and grossly normal motor exam.  SKIN:  No rashes;  Normal for age. PSYCH:  Alert and normal affect.         Abnormal lab results from this emergency department encounter:  Labs Reviewed   CBC WITH AUTOMATED DIFF - Abnormal; Notable for the following components:       Result Value WBC 14.9 (*)     HCT 45.1 (*)     NEUTROPHILS 89 (*)     LYMPHOCYTES 4 (*)     ABS. NEUTROPHILS 13.3 (*)     ABS. LYMPHOCYTES 0.6 (*)     ABS. IMM. GRANS. 0.1 (*)     All other components within normal limits   METABOLIC PANEL, BASIC - Abnormal; Notable for the following components:    Glucose 102 (*)     BUN/Creatinine ratio 7 (*)     All other components within normal limits   URINALYSIS W/ RFLX MICROSCOPIC       Lab values for this patient within approximately the last 12 hours:  Recent Results (from the past 12 hour(s))   CBC WITH AUTOMATED DIFF    Collection Time: 09/07/22  7:53 PM   Result Value Ref Range    WBC 14.9 (H) 4.6 - 13.2 K/uL    RBC 5.14 4.20 - 5.30 M/uL    HGB 15.1 12.0 - 16.0 g/dL    HCT 45.1 (H) 35.0 - 45.0 %    MCV 87.7 78.0 - 100.0 FL    MCH 29.4 24.0 - 34.0 PG    MCHC 33.5 31.0 - 37.0 g/dL    RDW 12.3 11.6 - 14.5 %    PLATELET 208 549 - 867 K/uL    MPV 10.9 9.2 - 11.8 FL    NRBC 0.0 0  WBC    ABSOLUTE NRBC 0.00 0.00 - 0.01 K/uL    NEUTROPHILS 89 (H) 40 - 73 %    LYMPHOCYTES 4 (L) 21 - 52 %    MONOCYTES 7 3 - 10 %    EOSINOPHILS 0 0 - 5 %    BASOPHILS 0 0 - 2 %    IMMATURE GRANULOCYTES 0 0.0 - 0.5 %    ABS. NEUTROPHILS 13.3 (H) 1.8 - 8.0 K/UL    ABS. LYMPHOCYTES 0.6 (L) 0.9 - 3.6 K/UL    ABS. MONOCYTES 1.0 0.05 - 1.2 K/UL    ABS. EOSINOPHILS 0.0 0.0 - 0.4 K/UL    ABS. BASOPHILS 0.0 0.0 - 0.1 K/UL    ABS. IMM.  GRANS. 0.1 (H) 0.00 - 0.04 K/UL    DF AUTOMATED     METABOLIC PANEL, BASIC    Collection Time: 09/07/22  7:53 PM   Result Value Ref Range    Sodium 141 136 - 145 mmol/L    Potassium 3.7 3.5 - 5.5 mmol/L    Chloride 108 100 - 111 mmol/L    CO2 26 21 - 32 mmol/L    Anion gap 7 3.0 - 18 mmol/L    Glucose 102 (H) 74 - 99 mg/dL    BUN 7 7.0 - 18 MG/DL    Creatinine 0.99 0.6 - 1.3 MG/DL    BUN/Creatinine ratio 7 (L) 12 - 20      GFR est AA >60 >60 ml/min/1.73m2    GFR est non-AA >60 >60 ml/min/1.73m2    Calcium 9.4 8.5 - 10.1 MG/DL       Radiologist and cardiologist interpretations if available at time of this note:  No orders to display       Medication(s) ordered for patient during this emergency visit encounter:  Medications   ketorolac (TORADOL) injection 15 mg (15 mg IntraVENous Given 9/7/22 2159)   promethazine (PHENERGAN) suppository 25 mg (25 mg Rectal Given 9/7/22 2159)       Progress: improved    IMPRESSION AND MEDICAL DECISION MAKING:  Based upon the patients presentation with noted HPI and PE, along with the work up done in the emergency department, I believe that the patient is having a toothache secondary to dental caries under the filling. She has known pulpitis for her dentist and will need to have a root canal.  Patient is vomiting in the ER, she does have history of cyclic vomiting. Has no abdominal pain, no peritoneal signs on exam.  She responds to Toradol/Phenergan in the ER. Will start on antibiotics. Patient will follow-up with her dentist as planned for definitive care. Discussed care, f/u and s/s warranting return to ED. Pt and her mother understood and agreed to plan. JOSELO Masters 10:01 PM     SPECIFIC PATIENT INSTRUCTIONS FROM THE PROVIDER WHO TREATED YOU IN THE ER TODAY    Take antibiotics as directed. Finish an entire course! Increase intake of over-the-counter probiotics (probiotic supplements, yogurt) when taking antibiotics to prevent antibiotic associated diarrhea and yeast infection. Use secondary birth control when taking antibiotics (for females, if applicable)   Rinse your mouth with mouth wash after each meal or more often. Take Tylenol (every 4-6 hours) Motrin (every 6-8 hours) for pain as needed. Follow-up in 7-10 days with your dentist as planned/scheduled. Diagnosis:   1. Toothache    2. Dental caries, unspecified    3.  Nausea and vomiting, unspecified vomiting type          Disposition: home    Follow-up Information       Follow up With Specialties Details Why [address] [place] [address]  In 1 week further care by your dentist [address] [place], [place]  ph:  [phone]    [facility] EMERGENCY DEPT Emergency Medicine  As needed, If symptoms worsen [address] [place]  [phone]            Patient's Medications   Start Taking    AMOXICILLIN (AMOXIL) 875 MG TABLET    Take 1 Tablet by mouth two (2) times a day for 10 days. FLUCONAZOLE (DIFLUCAN) 150 MG TABLET    Take 1 Tablet by mouth now for 1 dose. FDA advises cautious prescribing of oral fluconazole in pregnancy. IBUPROFEN (MOTRIN) 800 MG TABLET    Take 1 Tablet by mouth every six (6) hours as needed for Pain for up to 7 days. PROMETHAZINE (PHENERGAN) 25 MG TABLET    Take 1 Tablet by mouth every six (6) hours as needed for Nausea. Caution: This medication may make you drowsy. Avoid driving while under the influence of this medication. Continue Taking    No medications on file   These Medications have changed    No medications on file   Stop Taking    LEVONORGESTREL (MIRENA) 20 MCG/24 HOURS (6 YRS) 52 MG IUD    1 Device by IntraUTERine route once. METHOCARBAMOL (ROBAXIN) 500 MG TABLET    Take 1 Tablet by mouth three (3) times daily as needed for Muscle Spasm(s). ONDANSETRON (ZOFRAN ODT) 4 MG DISINTEGRATING TABLET    Take 1 Tablet by mouth every eight (8) hours as needed for Nausea or Vomiting. ONDANSETRON (ZOFRAN ODT) 8 MG DISINTEGRATING TABLET    Take 1 Tablet by mouth every eight (8) hours as needed for Nausea for up to 12 doses. ONDANSETRON HCL (ZOFRAN) 4 MG TABLET    Take 1 Tablet by mouth every eight (8) hours as needed for Nausea. Dictation disclaimer:  Please note that this dictation was completed with [employer] the [employer] voice recognition software. Quite often unanticipated grammatical, syntax, homophones, and other interpretive errors are inadvertently transcribed by the computer software. Please disregard these errors. Please excuse any errors that have escaped final proofreading.

## 2022-09-08 NOTE — ED TRIAGE NOTES
Pt presents to the ER with a CO of [facility]al Pain fever/ nausea/ vomiting.  Patient reports being told she needs a root canal.

## 2022-09-09 NOTE — ED PROVIDER NOTES
EMERGENCY DEPARTMENT HISTORY AND PHYSICAL EXAM    Date: 9/8/2022  Patient Name: Alberto Mazariegos    History of Presenting Illness     Chief Complaint   Patient presents with    Dental Pain    Nausea         History Provided By: Patient and Patient's Mother    Additional History (Context): Alberto Mazariegos is a 27 y.o. female with No significant past medical history who presents with worsening upper left dental pain. It was treated with some antibiotics but has completed them and is now having facial swelling and is febrile. Denies possibility of pregnancy. Denies drooling or trismus. PCP: None    Current Facility-Administered Medications   Medication Dose Route Frequency Provider Last Rate Last Admin    ibuprofen (MOTRIN) tablet 800 mg  800 mg Oral NOW Kalyani Holliday PA        amoxicillin-clavulanate (AUGMENTIN) 875-125 mg per tablet 1 Tablet  1 Tablet Oral NOW Kalyani Holliday PA        oxyCODONE-acetaminophen (PERCOCET) 5-325 mg per tablet 1 Tablet  1 Tablet Oral NOW Kalyani Holliday PA         Current Outpatient Medications   Medication Sig Dispense Refill    amoxicillin-clavulanate (Augmentin) 875-125 mg per tablet Take 1 Tablet by mouth two (2) times a day for 10 days. 20 Tablet 0    ibuprofen (MOTRIN) 800 mg tablet Take 1 Tablet by mouth every eight (8) hours for 5 days. 15 Tablet 0    Lidocaine Viscous 2 % solution Take 5 mL by mouth three (3) times daily as needed for Pain. Put 5ml onto guaze and put in mouth; hold for 30min. Can repeat up to twice daily. Indications: administration of local anesthetic drug 200 mL 0    oxyCODONE-acetaminophen (Percocet) 5-325 mg per tablet Take 1 Tablet by mouth every eight (8) hours as needed for Pain for up to 4 days. Max Daily Amount: 3 Tablets. 12 Tablet 0    promethazine (PHENERGAN) 25 mg tablet Take 1 Tablet by mouth every six (6) hours as needed for Nausea. Caution: This medication may make you drowsy. Avoid driving while under the influence of this medication.  12 Tablet 0    amoxicillin (AMOXIL) 875 mg tablet Take 1 Tablet by mouth two (2) times a day for 10 days. 20 Tablet 0    ibuprofen (MOTRIN) 800 mg tablet Take 1 Tablet by mouth every six (6) hours as needed for Pain for up to 7 days. 20 Tablet 0       Past History     Past Medical History:  No past medical history on file. Past Surgical History:  No past surgical history on file. Family History:  No family history on file. Social History:  Social History     Tobacco Use    Smoking status: Every Day    Smokeless tobacco: Never   Substance Use Topics    Alcohol use: Yes    Drug use: No       Allergies: Allergies   Allergen Reactions    Flagyl [Metronidazole] Anaphylaxis    Reglan [Metoclopramide] Swelling         Review of Systems   Review of Systems   Constitutional:  Positive for fever. HENT:  Positive for dental problem and facial swelling. Negative for drooling. All Other Systems Negative  Physical Exam     Vitals:    09/08/22 2010 09/08/22 2044   Pulse: (!) 101    Resp: 20    Temp: 98 °F (36.7 °C) (!) 101.7 °F (38.7 °C)   SpO2: 100%    Weight: 74.8 kg (165 lb)    Height: 5' 2\" (1.575 m)      Physical Exam  Vitals and nursing note reviewed. Constitutional:       General: She is in acute distress. Appearance: She is well-developed and normal weight. HENT:      Head: Normocephalic and atraumatic. Right Ear: External ear normal.      Left Ear: External ear normal.      Nose: Nose normal.      Mouth/Throat:      Comments: Draining left upper gingival abscess above the first and second premolar which is eroded and tender. No trismus. Not drooling. Eyes:      Conjunctiva/sclera: Conjunctivae normal.      Pupils: Pupils are equal, round, and reactive to light. Neck:      Vascular: No JVD. Trachea: No tracheal deviation. Cardiovascular:      Rate and Rhythm: Normal rate and regular rhythm. Heart sounds: Normal heart sounds. No murmur heard. No friction rub. No gallop. Pulmonary:      Effort: Pulmonary effort is normal. No respiratory distress. Breath sounds: Normal breath sounds. No wheezing or rales. Abdominal:      General: Bowel sounds are normal. There is no distension. Palpations: Abdomen is soft. There is no mass. Tenderness: There is no abdominal tenderness. There is no guarding or rebound. Musculoskeletal:         General: No tenderness. Normal range of motion. Cervical back: Normal range of motion and neck supple. Skin:     General: Skin is warm and dry. Findings: No rash. Neurological:      Mental Status: She is alert and oriented to person, place, and time. Cranial Nerves: No cranial nerve deficit. Deep Tendon Reflexes: Reflexes are normal and symmetric. Psychiatric:         Behavior: Behavior normal.          Diagnostic Study Results     Labs -     Recent Results (from the past 12 hour(s))   CBC WITH AUTOMATED DIFF    Collection Time: 09/08/22  9:22 PM   Result Value Ref Range    WBC 5.4 4.6 - 13.2 K/uL    RBC 5.24 4.20 - 5.30 M/uL    HGB 15.3 12.0 - 16.0 g/dL    HCT 46.0 (H) 35.0 - 45.0 %    MCV 87.8 78.0 - 100.0 FL    MCH 29.2 24.0 - 34.0 PG    MCHC 33.3 31.0 - 37.0 g/dL    RDW 12.2 11.6 - 14.5 %    PLATELET 776 777 - 463 K/uL    MPV 10.6 9.2 - 11.8 FL    NRBC 0.0 0  WBC    ABSOLUTE NRBC 0.00 0.00 - 0.01 K/uL    NEUTROPHILS 78 (H) 40 - 73 %    LYMPHOCYTES 12 (L) 21 - 52 %    MONOCYTES 9 3 - 10 %    EOSINOPHILS 0 0 - 5 %    BASOPHILS 0 0 - 2 %    IMMATURE GRANULOCYTES 0 0.0 - 0.5 %    ABS. NEUTROPHILS 4.2 1.8 - 8.0 K/UL    ABS. LYMPHOCYTES 0.7 (L) 0.9 - 3.6 K/UL    ABS. MONOCYTES 0.5 0.05 - 1.2 K/UL    ABS. EOSINOPHILS 0.0 0.0 - 0.4 K/UL    ABS. BASOPHILS 0.0 0.0 - 0.1 K/UL    ABS. IMM.  GRANS. 0.0 0.00 - 0.04 K/UL    DF AUTOMATED         Radiologic Studies -   No orders to display     CT Results  (Last 48 hours)      None          CXR Results  (Last 48 hours)      None              Medical Decision Making   I am the first provider for this patient. I reviewed the vital signs, available nursing notes, past medical history, past surgical history, family history and social history. Vital Signs-Reviewed the patient's vital signs. Records Reviewed: Nursing Notes    Procedures:  I&D Abcess Simple    Date/Time: 9/8/2022 11:25 PM  Performed by: JOSELO Velazquez  Authorized by: JOSELO Velazquez     Consent:     Consent obtained:  Verbal    Consent given by:  Patient  Universal protocol:     Patient identity confirmed:  Verbally with patient and arm band  Location:     Type:  Abscess    Location:  Mouth    Mouth location:  Alveolar process  Procedure type:     Complexity:  Simple  Procedure details:     Incision types:  Stab incision    Drainage:  Bloody and purulent  Post-procedure details:     Procedure completion:  Tolerated    Provider Notes (Medical Decision Making): I&D performed. Treat abscess w/ABX and f/up with dental.    MED RECONCILIATION:  Current Facility-Administered Medications   Medication Dose Route Frequency    ibuprofen (MOTRIN) tablet 800 mg  800 mg Oral NOW    amoxicillin-clavulanate (AUGMENTIN) 875-125 mg per tablet 1 Tablet  1 Tablet Oral NOW    oxyCODONE-acetaminophen (PERCOCET) 5-325 mg per tablet 1 Tablet  1 Tablet Oral NOW     Current Outpatient Medications   Medication Sig    amoxicillin-clavulanate (Augmentin) 875-125 mg per tablet Take 1 Tablet by mouth two (2) times a day for 10 days. ibuprofen (MOTRIN) 800 mg tablet Take 1 Tablet by mouth every eight (8) hours for 5 days. Lidocaine Viscous 2 % solution Take 5 mL by mouth three (3) times daily as needed for Pain. Put 5ml onto guaze and put in mouth; hold for 30min. Can repeat up to twice daily. Indications: administration of local anesthetic drug    oxyCODONE-acetaminophen (Percocet) 5-325 mg per tablet Take 1 Tablet by mouth every eight (8) hours as needed for Pain for up to 4 days. Max Daily Amount: 3 Tablets.     promethazine (PHENERGAN) 25 mg tablet Take 1 Tablet by mouth every six (6) hours as needed for Nausea. Caution: This medication may make you drowsy. Avoid driving while under the influence of this medication. amoxicillin (AMOXIL) 875 mg tablet Take 1 Tablet by mouth two (2) times a day for 10 days. ibuprofen (MOTRIN) 800 mg tablet Take 1 Tablet by mouth every six (6) hours as needed for Pain for up to 7 days. Disposition:  home    DISCHARGE NOTE:   9:32 PM    Pt has been reexamined. Patient has no new complaints, changes, or physical findings. Care plan outlined and precautions discussed. Results of exam were reviewed with the patient. All medications were reviewed with the patient; will d/c home with augmentin, ibuprofen, lidocaine, percocet. All of pt's questions and concerns were addressed. Patient was instructed and agrees to follow up with dental, as well as to return to the ED upon further deterioration. Patient is ready to go home. Follow-up Information       Follow up With Specialties Details Why [facility]  Schedule an appointment as soon as possible for a visit in 1 day  [address] [address]  [facility]  Schedule an appointment as soon as possible for a visit in 2 days  [address] [facility] [address]  [phone]    [facility] EMERGENCY DEPT Emergency Medicine  If symptoms worsen return immediately [address] [place]  [phone]            Current Discharge Medication List        START taking these medications    Details   amoxicillin-clavulanate (Augmentin) 875-125 mg per tablet Take 1 Tablet by mouth two (2) times a day for 10 days. Qty: 20 Tablet, Refills: 0  Start date: 9/8/2022, End date: 9/18/2022      !! ibuprofen (MOTRIN) 800 mg tablet Take 1 Tablet by mouth every eight (8) hours for 5 days.   Qty: 15 Tablet, Refills: 0  Start date: 9/8/2022, End date: 9/13/2022 Lidocaine Viscous 2 % solution Take 5 mL by mouth three (3) times daily as needed for Pain. Put 5ml onto guaze and put in mouth; hold for 30min. Can repeat up to twice daily. Indications: administration of local anesthetic drug  Qty: 200 mL, Refills: 0  Start date: 9/8/2022      oxyCODONE-acetaminophen (Percocet) 5-325 mg per tablet Take 1 Tablet by mouth every eight (8) hours as needed for Pain for up to 4 days. Max Daily Amount: 3 Tablets. Qty: 12 Tablet, Refills: 0  Start date: 9/8/2022, End date: 9/12/2022    Comments: ED Attending: Angela Carrillo DO  MAMTA: [id number]  Associated Diagnoses: Dental abscess       !! - Potential duplicate medications found. Please discuss with provider. CONTINUE these medications which have NOT CHANGED    Details   !! ibuprofen (MOTRIN) 800 mg tablet Take 1 Tablet by mouth every six (6) hours as needed for Pain for up to 7 days. Qty: 20 Tablet, Refills: 0       !! - Potential duplicate medications found. Please discuss with provider. Diagnosis     Clinical Impression:   1. Dental abscess    2.  Dental caries

## 2022-09-09 NOTE — PROGRESS NOTES
Prochlorperazine 5mg IV was therapeutically interchanged for Promethazine 12.5mg IV per the P&T Committee approved Therapeutic Interchanges Policy.

## 2022-09-09 NOTE — ED TRIAGE NOTES
Patient seen last night for dental pain, root canal scheduled for patient. Patient has left sided facial swelling and pressure behind left eye.  Patient endorses N&V.

## 2022-10-13 ENCOUNTER — HOSPITAL ENCOUNTER (EMERGENCY)
Age: 27
Discharge: HOME | End: 2022-10-13
Attending: EMERGENCY MEDICINE
Payer: MEDICAID

## 2022-10-13 VITALS
RESPIRATION RATE: 16 BRPM | HEART RATE: 70 BPM | HEIGHT: 63 IN | WEIGHT: 164 LBS | OXYGEN SATURATION: 99 % | TEMPERATURE: 98.3 F | DIASTOLIC BLOOD PRESSURE: 94 MMHG | BODY MASS INDEX: 29.06 KG/M2 | SYSTOLIC BLOOD PRESSURE: 134 MMHG

## 2022-10-13 DIAGNOSIS — R11.2 NAUSEA AND VOMITING, UNSPECIFIED VOMITING TYPE: ICD-10-CM

## 2022-10-13 DIAGNOSIS — N93.9 ABNORMAL UTERINE BLEEDING: ICD-10-CM

## 2022-10-13 DIAGNOSIS — R51.9 NONINTRACTABLE HEADACHE, UNSPECIFIED CHRONICITY PATTERN, UNSPECIFIED HEADACHE TYPE: Primary | ICD-10-CM

## 2022-10-13 LAB
APPEARANCE UR: CLEAR
BACTERIA URNS QL MICRO: NEGATIVE /HPF
BILIRUB UR QL: NEGATIVE
COLOR UR: YELLOW
EPITH CASTS URNS QL MICRO: NORMAL /LPF (ref 0–5)
FLUAV RNA SPEC QL NAA+PROBE: NOT DETECTED
FLUBV RNA SPEC QL NAA+PROBE: NOT DETECTED
GLUCOSE UR STRIP.AUTO-MCNC: NEGATIVE MG/DL
HCG UR QL: NEGATIVE
HGB UR QL STRIP: ABNORMAL
KETONES UR QL STRIP.AUTO: ABNORMAL MG/DL
LEUKOCYTE ESTERASE UR QL STRIP.AUTO: NEGATIVE
NITRITE UR QL STRIP.AUTO: NEGATIVE
PH UR STRIP: 6 [PH] (ref 5–8)
PROT UR STRIP-MCNC: ABNORMAL MG/DL
RBC #/AREA URNS HPF: NORMAL /HPF (ref 0–5)
SARS-COV-2, COV2: NOT DETECTED
SP GR UR REFRACTOMETRY: 1.02 (ref 1–1.03)
UROBILINOGEN UR QL STRIP.AUTO: 0.2 EU/DL (ref 0.2–1)
WBC URNS QL MICRO: NEGATIVE /HPF (ref 0–4)

## 2022-10-13 PROCEDURE — 81001 URINALYSIS AUTO W/SCOPE: CPT

## 2022-10-13 PROCEDURE — 81025 URINE PREGNANCY TEST: CPT

## 2022-10-13 PROCEDURE — 99283 EMERGENCY DEPT VISIT LOW MDM: CPT

## 2022-10-13 PROCEDURE — 87636 SARSCOV2 & INF A&B AMP PRB: CPT

## 2022-10-13 PROCEDURE — 74011250637 HC RX REV CODE- 250/637: Performed by: PHYSICIAN ASSISTANT

## 2022-10-13 RX ORDER — ONDANSETRON 4 MG/1
4 TABLET, ORALLY DISINTEGRATING ORAL
Status: COMPLETED | OUTPATIENT
Start: 2022-10-13 | End: 2022-10-13

## 2022-10-13 RX ORDER — ONDANSETRON 4 MG/1
4 TABLET, FILM COATED ORAL
Qty: 20 TABLET | Refills: 0 | Status: SHIPPED | OUTPATIENT
Start: 2022-10-13

## 2022-10-13 RX ORDER — DICYCLOMINE HYDROCHLORIDE 10 MG/1
10 CAPSULE ORAL 3 TIMES DAILY
Qty: 30 CAPSULE | Refills: 0 | Status: SHIPPED | OUTPATIENT
Start: 2022-10-13

## 2022-10-13 RX ADMIN — ONDANSETRON 4 MG: 4 TABLET, ORALLY DISINTEGRATING ORAL at 14:40

## 2022-10-13 NOTE — ED PROVIDER NOTES
EMERGENCY DEPARTMENT HISTORY AND PHYSICAL EXAM    Date: 10/13/2022  Patient Name: Simón Sanchez    History of Presenting Illness     Chief Complaint   Patient presents with    Headache    Vomiting         History Provided By: Patient    Chief Complaint: Headache, menstrual cycle problem, nausea, vomiting, abdominal cramping  Duration: Couple days  Timing: Gradual  Location: Abdomen  Quality: Cramping  severity: Moderate  Modifying Factors: Vomiting occurs after an intense abdominal cramp  Associated Symptoms: none       Additional History (Context): Simón Sanchez is a 27 y.o. female who is a  Ab1 presenting today for issues listed above. Patient reports she is about 13 days late and starting her menstrual cycle. Reports she took a at home pregnancy test which was negative. Patient reports that her daughter at home has had a cough but nothing more than that. Denies any other known sick contacts or recent travel. Reports a history of hyperemesis with her prior pregnancies and states that this does not feel similar. Denies any vaginal discharge or odor. Reports some very light vaginal bleeding that is abnormal for her. PCP: None    Current Outpatient Medications   Medication Sig Dispense Refill    dicyclomine (BENTYL) 10 mg capsule Take 1 Capsule by mouth three (3) times daily. 30 Capsule 0    ondansetron hcl (Zofran) 4 mg tablet Take 1 Tablet by mouth every eight (8) hours as needed for Nausea or Vomiting. 20 Tablet 0    Lidocaine Viscous 2 % solution Take 5 mL by mouth three (3) times daily as needed for Pain. Put 5ml onto guaze and put in mouth; hold for 30min. Can repeat up to twice daily. Indications: administration of local anesthetic drug 200 mL 0    promethazine (PHENERGAN) 25 mg tablet Take 1 Tablet by mouth every six (6) hours as needed for Nausea. 12 Tablet 0    promethazine (PHENERGAN) 25 mg tablet Take 1 Tablet by mouth every six (6) hours as needed for Nausea. Caution:   This medication may make you drowsy. Avoid driving while under the influence of this medication. 12 Tablet 0       Past History     Past Medical History:  History reviewed. No pertinent past medical history. Past Surgical History:  History reviewed. No pertinent surgical history. Family History:  History reviewed. No pertinent family history. Social History:  Social History     Tobacco Use    Smoking status: Never    Smokeless tobacco: Never   Vaping Use    Vaping Use: Never used   Substance Use Topics    Alcohol use: Yes    Drug use: No       Allergies: Allergies   Allergen Reactions    Flagyl [Metronidazole] Anaphylaxis    Reglan [Metoclopramide] Swelling         Review of Systems   Review of Systems   Constitutional:  Negative for chills and fever. HENT:  Negative for congestion, rhinorrhea and sore throat. Respiratory:  Negative for cough and shortness of breath. Cardiovascular:  Negative for chest pain. Gastrointestinal:  Positive for abdominal pain, nausea and vomiting. Negative for blood in stool, constipation and diarrhea. Genitourinary:  Positive for menstrual problem and vaginal bleeding. Negative for dysuria, frequency and hematuria. Musculoskeletal:  Negative for back pain and myalgias. Skin:  Negative for rash and wound. Neurological:  Positive for headaches. Negative for dizziness. All other systems reviewed and are negative. All Other Systems Negative  Physical Exam     Vitals:    10/13/22 1244   BP: (!) 134/94   Pulse: 70   Resp: 16   Temp: 98.3 °F (36.8 °C)   SpO2: 99%   Weight: 74.4 kg (164 lb)   Height: 5' 3\" (1.6 m)     Physical Exam  Vitals and nursing note reviewed. Constitutional:       General: She is not in acute distress. Appearance: She is well-developed. She is not diaphoretic. Comments: Overall well appearing    HENT:      Head: Normocephalic and atraumatic.    Eyes:      Conjunctiva/sclera: Conjunctivae normal.   Cardiovascular:      Rate and Rhythm: Normal rate and regular rhythm. Heart sounds: Normal heart sounds. Pulmonary:      Effort: Pulmonary effort is normal. No respiratory distress. Breath sounds: Normal breath sounds. Chest:      Chest wall: No tenderness. Abdominal:      General: Bowel sounds are normal. There is no distension. Palpations: Abdomen is soft. Tenderness: There is no abdominal tenderness. There is no guarding or rebound. Musculoskeletal:         General: No deformity. Cervical back: Normal range of motion and neck supple. Skin:     General: Skin is warm and dry. Neurological:      Mental Status: She is alert and oriented to person, place, and time. Deep Tendon Reflexes: Reflexes are normal and symmetric.          Diagnostic Study Results     Labs -     Recent Results (from the past 12 hour(s))   URINALYSIS W/ RFLX MICROSCOPIC    Collection Time: 10/13/22  2:50 PM   Result Value Ref Range    Color YELLOW      Appearance CLEAR      Specific gravity 1.023 1.005 - 1.030      pH (UA) 6.0 5.0 - 8.0      Protein TRACE (A) NEG mg/dL    Glucose Negative NEG mg/dL    Ketone TRACE (A) NEG mg/dL    Bilirubin Negative NEG      Blood LARGE (A) NEG      Urobilinogen 0.2 0.2 - 1.0 EU/dL    Nitrites Negative NEG      Leukocyte Esterase Negative NEG     HCG URINE, QL    Collection Time: 10/13/22  2:50 PM   Result Value Ref Range    HCG urine, QL Negative NEG     COVID-19 WITH INFLUENZA A/B    Collection Time: 10/13/22  2:50 PM   Result Value Ref Range    SARS-CoV-2 by PCR Not detected NOTD      Influenza A by PCR Not detected NOTD      Influenza B by PCR Not detected NOTD     URINE MICROSCOPIC ONLY    Collection Time: 10/13/22  2:50 PM   Result Value Ref Range    WBC Negative 0 - 4 /hpf    RBC 0 to 1 0 - 5 /hpf    Epithelial cells FEW 0 - 5 /lpf    Bacteria Negative NEG /hpf       Radiologic Studies -   No orders to display     CT Results  (Last 48 hours)      None          CXR Results  (Last 48 hours)      None Medical Decision Making   I am the first provider for this patient. I reviewed the vital signs, available nursing notes, past medical history, past surgical history, family history and social history. Vital Signs-Reviewed the patient's vital signs. Records Reviewed: Nursing Notes and Old Medical Records     Procedures: None   Procedures    Provider Notes (Medical Decision Making):     Differential: COVID, influenza, gastroenteritis, pregnancy, ectopic pregnancy, threatened miscarriage, abnormal uterine bleeding, UTI    Plan: We will order UA, urine pregnancy and COVID/influenza screening. If urine pregnancy is positive will order labs and ultrasound. ED Course as of 10/13/22 1539   Thu Oct 13, 2022   1521 Urine preg negative, pending COVID/flu  [CS]      ED Course User Index  [CS] JOSELO Huff     3:41 PM  Covid and flu negative. Will discharge home with Bentyl and Zofran for nausea vomiting and abdominal pain. Pregnancy negative, do not need any further intervention with this. Have advised OB/GYN follow-up for abnormal uterine bleeding. Will discharge home. MED RECONCILIATION:  No current facility-administered medications for this encounter. Current Outpatient Medications   Medication Sig    dicyclomine (BENTYL) 10 mg capsule Take 1 Capsule by mouth three (3) times daily. ondansetron hcl (Zofran) 4 mg tablet Take 1 Tablet by mouth every eight (8) hours as needed for Nausea or Vomiting. Lidocaine Viscous 2 % solution Take 5 mL by mouth three (3) times daily as needed for Pain. Put 5ml onto guaze and put in mouth; hold for 30min. Can repeat up to twice daily. Indications: administration of local anesthetic drug    promethazine (PHENERGAN) 25 mg tablet Take 1 Tablet by mouth every six (6) hours as needed for Nausea. promethazine (PHENERGAN) 25 mg tablet Take 1 Tablet by mouth every six (6) hours as needed for Nausea. Caution:   This medication may make you drowsy. Avoid driving while under the influence of this medication. Disposition:  Home     DISCHARGE NOTE:   Pt has been reexamined. Patient has no new complaints, changes, or physical findings. Care plan outlined and precautions discussed. Results of workup were reviewed with the patient. All medications were reviewed with the patient. All of pt's questions and concerns were addressed. Patient was instructed and agrees to follow up with PCP/OBGYN as well as to return to the ED upon further deterioration. Patient is ready to go home. Follow-up Information       Follow up With Specialties Details Why Contact Info    [facility] EM[facility]GENCY DEPT Emergency Medicine  As needed [address]  [phone]    Follow-up in 1-2 days with your OBGYN and PCP                Current Discharge Medication List        START taking these medications    Details   dicyclomine (BENTYL) 10 mg capsule Take 1 Capsule by mouth three (3) times daily. Qty: 30 Capsule, Refills: 0  Start date: 10/13/2022      ondansetron hcl (Zofran) 4 mg tablet Take 1 Tablet by mouth every eight (8) hours as needed for Nausea or Vomiting. Qty: 20 Tablet, Refills: 0  Start date: 10/13/2022                 Diagnosis     Clinical Impression:   1. Nonintractable headache, unspecified chronicity pattern, unspecified headache type    2. Abnormal uterine bleeding    3. Nausea and vomiting, unspecified vomiting type          \"Please note that this dictation was completed with [employer], the computer voice recognition software. Quite often unanticipated grammatical, syntax, homophones, and other interpretive errors are inadvertently transcribed by the computer software. Please disregard these errors. Please excuse any errors that have escaped final proofreading. \"

## 2022-10-13 NOTE — Clinical Note
[address] [place] [address] Dr Janay Sim [facility] EMERGENCY DEPT  [address] [facility] [address]  [phone]    Work/School Note    Date: 10/13/2022    To Whom It May concern:    Aydin Scott was seen and treated today in the emergency room by the following provider(s):  Attending Provider: Carrie Crum MD  Physician Assistant: JOSELO John. Aydin Scott is excused from work/school on 10/13/2022 through 10/15/2022. She is medically clear to return to work/school on 10/16/2022.          Sincerely,          JOSELO Hernández

## 2022-10-13 NOTE — Clinical Note
[facility] EMERGENCY DEPT  [address] [address] [phone]    Work/School Note    Date: 10/13/2022    To Whom It May concern:    Koffi Logan was seen and treated today in the emergency room by the following provider(s):  Attending Provider: Chandler Harkins MD  Physician Assistant: Vella Duverney, PA. Koffi Logan is excused from work/school on 10/13/2022 through 10/15/2022. She is medically clear to return to work/school on 10/16/2022.          Sincerely,          JOSELO Moulton

## 2022-10-13 NOTE — ED TRIAGE NOTES
Patient states right sided HA, +photophobia and vomiting x2 days. Denies history of migraines  States \"spotting\" not normal period at this time, will obtain urine sample.

## 2022-11-12 ENCOUNTER — HOSPITAL ENCOUNTER (EMERGENCY)
Age: 27
Discharge: HOME | End: 2022-11-12
Attending: EMERGENCY MEDICINE
Payer: MEDICAID

## 2022-11-12 VITALS
SYSTOLIC BLOOD PRESSURE: 132 MMHG | HEART RATE: 51 BPM | OXYGEN SATURATION: 98 % | RESPIRATION RATE: 18 BRPM | TEMPERATURE: 98.1 F | DIASTOLIC BLOOD PRESSURE: 92 MMHG

## 2022-11-12 DIAGNOSIS — F10.120 HANGOVER WITHOUT COMPLICATION (HCC): Primary | ICD-10-CM

## 2022-11-12 LAB
FLUAV RNA SPEC QL NAA+PROBE: NOT DETECTED
FLUBV RNA SPEC QL NAA+PROBE: NOT DETECTED
SARS-COV-2, COV2: NOT DETECTED

## 2022-11-12 PROCEDURE — 96375 TX/PRO/DX INJ NEW DRUG ADDON: CPT

## 2022-11-12 PROCEDURE — 96374 THER/PROPH/DIAG INJ IV PUSH: CPT

## 2022-11-12 PROCEDURE — 74011250636 HC RX REV CODE- 250/636: Performed by: PHYSICIAN ASSISTANT

## 2022-11-12 PROCEDURE — 74011000250 HC RX REV CODE- 250: Performed by: PHYSICIAN ASSISTANT

## 2022-11-12 PROCEDURE — 87636 SARSCOV2 & INF A&B AMP PRB: CPT

## 2022-11-12 PROCEDURE — 99284 EMERGENCY DEPT VISIT MOD MDM: CPT

## 2022-11-12 PROCEDURE — C9113 INJ PANTOPRAZOLE SODIUM, VIA: HCPCS | Performed by: PHYSICIAN ASSISTANT

## 2022-11-12 PROCEDURE — 96361 HYDRATE IV INFUSION ADD-ON: CPT

## 2022-11-12 RX ORDER — ONDANSETRON 2 MG/ML
4 INJECTION INTRAMUSCULAR; INTRAVENOUS
Status: COMPLETED | OUTPATIENT
Start: 2022-11-12 | End: 2022-11-12

## 2022-11-12 RX ORDER — ONDANSETRON 4 MG/1
4 TABLET, ORALLY DISINTEGRATING ORAL
Status: COMPLETED | OUTPATIENT
Start: 2022-11-12 | End: 2022-11-12

## 2022-11-12 RX ORDER — ONDANSETRON 4 MG/1
4 TABLET, FILM COATED ORAL
Qty: 20 TABLET | Refills: 0 | Status: SHIPPED | OUTPATIENT
Start: 2022-11-12

## 2022-11-12 RX ORDER — FAMOTIDINE 20 MG/1
20 TABLET, FILM COATED ORAL 2 TIMES DAILY
Qty: 20 TABLET | Refills: 0 | Status: SHIPPED | OUTPATIENT
Start: 2022-11-12 | End: 2022-11-22

## 2022-11-12 RX ADMIN — ONDANSETRON 4 MG: 4 TABLET, ORALLY DISINTEGRATING ORAL at 08:22

## 2022-11-12 RX ADMIN — ONDANSETRON 4 MG: 2 INJECTION INTRAMUSCULAR; INTRAVENOUS at 08:58

## 2022-11-12 RX ADMIN — SODIUM CHLORIDE 1000 ML: 9 INJECTION, SOLUTION INTRAVENOUS at 08:59

## 2022-11-12 RX ADMIN — SODIUM CHLORIDE 40 MG: 9 INJECTION, SOLUTION INTRAMUSCULAR; INTRAVENOUS; SUBCUTANEOUS at 09:31

## 2022-11-12 NOTE — Clinical Note
[facility] EMERGENCY DEPT  [address] [address] [phone]    Work/School Note    Date: 11/12/2022    To Whom It May concern:    Sachi Fritz was seen and treated today in the emergency room by the following provider(s):  Attending Provider: Felicia Hubbard MD  Physician Assistant: JOSELO Malik. Sachi Fritz is excused from work/school on 11/12/22 and 11/13/22. She is medically clear to return to work/school on 11/14/2022.        Sincerely,          JOSELO Oscar

## 2022-11-12 NOTE — ED PROVIDER NOTES
EMERGENCY DEPARTMENT HISTORY AND PHYSICAL EXAM    Date: 11/12/2022  Patient Name: Neha Hong    History of Presenting Illness     Chief Complaint   Patient presents with    Vomiting    Nausea         History Provided By: Patient    Additional History (Context): Neha Hong is a 27 y.o. female alcohol abuse who presents today for issues listed above. Patient reports she has been to this emergency department multiple times for hangovers. States \"I need an IV\". Reports she is not concerned for pregnancy and is not currently sexually active. Has not tried thing for this at home. Reports nausea, vomiting and diarrhea. Denies known sick contacts or recent travel. Patient uncooperative during history      PCP: None    Current Facility-Administered Medications   Medication Dose Route Frequency Provider Last Rate Last Admin    sodium chloride 0.9 % bolus infusion 1,000 mL  1,000 mL IntraVENous ONCE Ying Singh PA 1,000 mL/hr at 11/12/22 0859 1,000 mL at 11/12/22 3967     Current Outpatient Medications   Medication Sig Dispense Refill    famotidine (Pepcid) 20 mg tablet Take 1 Tablet by mouth two (2) times a day for 10 days. 20 Tablet 0    ondansetron hcl (Zofran) 4 mg tablet Take 1 Tablet by mouth every eight (8) hours as needed for Nausea or Vomiting. 20 Tablet 0    dicyclomine (BENTYL) 10 mg capsule Take 1 Capsule by mouth three (3) times daily. 30 Capsule 0    ondansetron hcl (Zofran) 4 mg tablet Take 1 Tablet by mouth every eight (8) hours as needed for Nausea or Vomiting. 20 Tablet 0    Lidocaine Viscous 2 % solution Take 5 mL by mouth three (3) times daily as needed for Pain. Put 5ml onto guaze and put in mouth; hold for 30min. Can repeat up to twice daily. Indications: administration of local anesthetic drug 200 mL 0    promethazine (PHENERGAN) 25 mg tablet Take 1 Tablet by mouth every six (6) hours as needed for Nausea.  12 Tablet 0    promethazine (PHENERGAN) 25 mg tablet Take 1 Tablet by mouth every six (6) hours as needed for Nausea. Caution: This medication may make you drowsy. Avoid driving while under the influence of this medication. 12 Tablet 0       Past History     Past Medical History:  No past medical history on file. Past Surgical History:  No past surgical history on file. Family History:  No family history on file. Social History:  Social History     Tobacco Use    Smoking status: Never    Smokeless tobacco: Never   Vaping Use    Vaping Use: Never used   Substance Use Topics    Alcohol use: Yes    Drug use: No       Allergies: Allergies   Allergen Reactions    Flagyl [Metronidazole] Anaphylaxis    Reglan [Metoclopramide] Swelling         Review of Systems   Review of Systems   Constitutional:  Negative for chills, fatigue and fever. HENT:  Negative for congestion, rhinorrhea and sore throat. Eyes:  Negative for pain. Respiratory:  Negative for cough and shortness of breath. Cardiovascular:  Negative for chest pain. Gastrointestinal:  Positive for diarrhea, nausea and vomiting. Negative for abdominal pain, blood in stool and constipation. Genitourinary:  Negative for dysuria, frequency and hematuria. Musculoskeletal:  Negative for back pain and myalgias. Skin:  Negative for rash and wound. Neurological:  Negative for dizziness and headaches. All other systems reviewed and are negative. All Other Systems Negative  Physical Exam     Vitals:    11/12/22 0830 11/12/22 0836   BP:  (!) 132/92   Pulse: (!) 51    Resp: 18    Temp: 98.1 °F (36.7 °C)    SpO2: 98%      Physical Exam  Vitals and nursing note reviewed. Constitutional:       General: She is not in acute distress. Appearance: She is well-developed. She is not diaphoretic. HENT:      Head: Normocephalic and atraumatic. Eyes:      Conjunctiva/sclera: Conjunctivae normal.   Cardiovascular:      Rate and Rhythm: Normal rate and regular rhythm. Heart sounds: Normal heart sounds.    Pulmonary: Effort: Pulmonary effort is normal. No respiratory distress. Breath sounds: Normal breath sounds. Chest:      Chest wall: No tenderness. Abdominal:      General: There is no distension. Comments: Unable to examine   Musculoskeletal:         General: No deformity. Cervical back: Normal range of motion and neck supple. Skin:     General: Skin is warm and dry. Neurological:      Mental Status: She is alert and oriented to person, place, and time. Deep Tendon Reflexes: Reflexes are normal and symmetric. Psychiatric:         Behavior: Behavior is agitated. Comments: Moaning and writhing in the chair, walking around the room         Diagnostic Study Results     Labs -     Recent Results (from the past 12 hour(s))   COVID-19 WITH INFLUENZA A/B    Collection Time: 11/12/22  8:27 AM   Result Value Ref Range    SARS-CoV-2 by PCR Not detected NOTD      Influenza A by PCR Not detected NOTD      Influenza B by PCR Not detected NOTD         Radiologic Studies -   No orders to display     CT Results  (Last 48 hours)      None          CXR Results  (Last 48 hours)      None              Medical Decision Making   I am the first provider for this patient. I reviewed the vital signs, available nursing notes, past medical history, past surgical history, family history and social history. Vital Signs-Reviewed the patient's vital signs. Records Reviewed: Nursing Notes and Old Medical Records     Procedures: None   Procedures    Provider Notes (Medical Decision Making):     Differential: Pregnancy, viral illness, hangover, substance abuse    Plan: Did order patient Zofran initially however she reports she threw that up. Patient demanding IV. ED Course as of 11/12/22 0946   Sat Nov 12, 2022   1540 Patient refusing any sort of work-up to include UA and urine pregnancy. Refusing COVID swab. States \"I have to have an IV\".   Discussed with patient I do not feel comfortable giving her additional Zofran without urine pregnancy. Patient requesting bedside commode. IV has been placed at this time. Have ordered IV Zofran and fluids [CS]   3204 Patient reports she is feeling much better after her IV medication. Will discharge home with Zofran and Pepcid and have advised alcohol cessation. COVID and influenza screening negative. [CS]      ED Course User Index  [CS] JOSELO Palma         MED RECONCILIATION:  Current Facility-Administered Medications   Medication Dose Route Frequency    sodium chloride 0.9 % bolus infusion 1,000 mL  1,000 mL IntraVENous ONCE     Current Outpatient Medications   Medication Sig    famotidine (Pepcid) 20 mg tablet Take 1 Tablet by mouth two (2) times a day for 10 days. ondansetron hcl (Zofran) 4 mg tablet Take 1 Tablet by mouth every eight (8) hours as needed for Nausea or Vomiting. dicyclomine (BENTYL) 10 mg capsule Take 1 Capsule by mouth three (3) times daily. ondansetron hcl (Zofran) 4 mg tablet Take 1 Tablet by mouth every eight (8) hours as needed for Nausea or Vomiting. Lidocaine Viscous 2 % solution Take 5 mL by mouth three (3) times daily as needed for Pain. Put 5ml onto guaze and put in mouth; hold for 30min. Can repeat up to twice daily. Indications: administration of local anesthetic drug    promethazine (PHENERGAN) 25 mg tablet Take 1 Tablet by mouth every six (6) hours as needed for Nausea. promethazine (PHENERGAN) 25 mg tablet Take 1 Tablet by mouth every six (6) hours as needed for Nausea. Caution: This medication may make you drowsy. Avoid driving while under the influence of this medication. Disposition:  Home     DISCHARGE NOTE:   Pt has been reexamined. Patient has no new complaints, changes, or physical findings. Care plan outlined and precautions discussed. Results of workup were reviewed with the patient. All medications were reviewed with the patient. All of pt's questions and concerns were addressed.  Patient was instructed and agrees to follow up with PCP as well as to return to the ED upon further deterioration. Patient is ready to go home. Follow-up Information       Follow up With Specialties Details Why Contact Info    [facility] EMERGENCY DEPT Emergency Medicine  As needed [address] [address]  [address] [facility]  Schedule an appointment as soon as possible for a visit   [address]  [phone]            Current Discharge Medication List        START taking these medications    Details   famotidine (Pepcid) 20 mg tablet Take 1 Tablet by mouth two (2) times a day for 10 days. Qty: 20 Tablet, Refills: 0  Start date: 11/12/2022, End date: 11/22/2022      !! ondansetron hcl (Zofran) 4 mg tablet Take 1 Tablet by mouth every eight (8) hours as needed for Nausea or Vomiting. Qty: 20 Tablet, Refills: 0  Start date: 11/12/2022       !! - Potential duplicate medications found. Please discuss with provider. CONTINUE these medications which have NOT CHANGED    Details   !! ondansetron hcl (Zofran) 4 mg tablet Take 1 Tablet by mouth every eight (8) hours as needed for Nausea or Vomiting. Qty: 20 Tablet, Refills: 0       !! - Potential duplicate medications found. Please discuss with provider. Diagnosis     Clinical Impression:   1. Hangover without complication ([address]          \"Please note that this dictation was completed with [employer], the computer voice recognition software. Quite often unanticipated grammatical, syntax, homophones, and other interpretive errors are inadvertently transcribed by the computer software. Please disregard these errors. Please excuse any errors that have escaped final proofreading. \"

## 2022-11-12 NOTE — Clinical Note
[address] [place] [address] [facility] EMERGENCY DEPT  [address] [address] [phone]    Work/School Note    Date: 11/12/2022    To Whom It May concern:    Jayashree Jiménez was seen and treated today in the emergency room by the following provider(s):  Attending Provider: Kofi Pederson MD  Physician Assistant: JOSELO Whaley. Jayashree Jiménez is excused from work/school on 11/12/22 and 11/13/22. She is medically clear to return to work/school on 11/14/2022.        Sincerely,          JOSELO Omer